# Patient Record
Sex: FEMALE | Race: WHITE | Employment: FULL TIME | URBAN - METROPOLITAN AREA
[De-identification: names, ages, dates, MRNs, and addresses within clinical notes are randomized per-mention and may not be internally consistent; named-entity substitution may affect disease eponyms.]

---

## 2018-07-18 ENCOUNTER — OFFICE VISIT (OUTPATIENT)
Dept: ORTHOPEDIC SURGERY | Age: 58
End: 2018-07-18

## 2018-07-18 VITALS
DIASTOLIC BLOOD PRESSURE: 75 MMHG | HEIGHT: 63 IN | BODY MASS INDEX: 33.66 KG/M2 | WEIGHT: 190 LBS | SYSTOLIC BLOOD PRESSURE: 121 MMHG | HEART RATE: 75 BPM

## 2018-07-18 DIAGNOSIS — S83.241A TEAR OF MEDIAL MENISCUS OF RIGHT KNEE, CURRENT, UNSPECIFIED TEAR TYPE, INITIAL ENCOUNTER: ICD-10-CM

## 2018-07-18 DIAGNOSIS — M25.562 LEFT KNEE PAIN, UNSPECIFIED CHRONICITY: ICD-10-CM

## 2018-07-18 DIAGNOSIS — M25.561 RIGHT KNEE PAIN, UNSPECIFIED CHRONICITY: Primary | ICD-10-CM

## 2018-07-18 DIAGNOSIS — M71.21 BAKER'S CYST OF KNEE, RIGHT: ICD-10-CM

## 2018-07-18 PROCEDURE — 99213 OFFICE O/P EST LOW 20 MIN: CPT | Performed by: PHYSICIAN ASSISTANT

## 2018-07-18 RX ORDER — METHYLPREDNISOLONE 4 MG/1
TABLET ORAL
Qty: 1 KIT | Refills: 0 | Status: SHIPPED | OUTPATIENT
Start: 2018-07-18 | End: 2018-07-24

## 2018-07-18 NOTE — PROGRESS NOTES
arthritic changes are noted. Direction views of the left knee were taken today and reveal normal anatomy with no acute bony abnormalities. Mild patellofemoral arthritic changes are noted    Orders     Orders Placed This Encounter   Procedures    XR KNEE RIGHT (MIN 4 VIEWS)    XR KNEE LEFT (MIN 4 VIEWS)         Assessment / Treatment Plan:     1. Right knee medial meniscus tear/Baker cyst    I discussed with her that her symptoms are most consistent with a Baker cyst as a result of her medial meniscus tear. She is still interested in conservative management of this and was prescribed a Medrol Dosepak today. She has tolerated this medication of pain in the past.  Her left knee pain seems to be compensatory. She does have a brace at home she can use as needed. Brennen Sanchez [facility]    This dictation was performed with a verbal recognition program [facility]) and it was checked for errors. It is possible that there are still dictated errors within this office note. If so, please bring any errors to my attention for an addendum. All efforts were made to ensure that this office note is accurate.

## 2018-08-01 ENCOUNTER — OFFICE VISIT (OUTPATIENT)
Dept: ORTHOPEDIC SURGERY | Age: 58
End: 2018-08-01

## 2018-08-01 VITALS — HEIGHT: 63 IN | WEIGHT: 190 LBS | BODY MASS INDEX: 33.66 KG/M2

## 2018-08-01 DIAGNOSIS — S83.241A TEAR OF MEDIAL MENISCUS OF RIGHT KNEE, CURRENT, UNSPECIFIED TEAR TYPE, INITIAL ENCOUNTER: Primary | ICD-10-CM

## 2018-08-01 PROCEDURE — 99213 OFFICE O/P EST LOW 20 MIN: CPT | Performed by: ORTHOPAEDIC SURGERY

## 2018-08-01 RX ORDER — DICLOFENAC SODIUM 75 MG/1
75 TABLET, DELAYED RELEASE ORAL 2 TIMES DAILY
Qty: 60 TABLET | Refills: 1 | Status: SHIPPED | OUTPATIENT
Start: 2018-08-01 | End: 2018-10-18 | Stop reason: SDUPTHER

## 2018-08-03 ENCOUNTER — TELEPHONE (OUTPATIENT)
Dept: ORTHOPEDIC SURGERY | Age: 58
End: 2018-08-03

## 2018-08-08 ENCOUNTER — OFFICE VISIT (OUTPATIENT)
Dept: ORTHOPEDIC SURGERY | Age: 58
End: 2018-08-08

## 2018-08-08 VITALS — HEIGHT: 63 IN | WEIGHT: 190 LBS | BODY MASS INDEX: 33.66 KG/M2

## 2018-08-08 DIAGNOSIS — S83.281A ACUTE LATERAL MENISCUS TEAR OF RIGHT KNEE, INITIAL ENCOUNTER: ICD-10-CM

## 2018-08-08 DIAGNOSIS — S83.241A ACUTE MEDIAL MENISCUS TEAR OF RIGHT KNEE, INITIAL ENCOUNTER: Primary | ICD-10-CM

## 2018-08-08 DIAGNOSIS — S83.241A TEAR OF MEDIAL MENISCUS OF RIGHT KNEE, CURRENT, UNSPECIFIED TEAR TYPE, INITIAL ENCOUNTER: Primary | ICD-10-CM

## 2018-08-08 DIAGNOSIS — M17.11 PRIMARY OSTEOARTHRITIS OF RIGHT KNEE: ICD-10-CM

## 2018-08-08 DIAGNOSIS — S83.281A OTHER TEAR OF LATERAL MENISCUS OF RIGHT KNEE AS CURRENT INJURY, INITIAL ENCOUNTER: ICD-10-CM

## 2018-08-08 DIAGNOSIS — M25.561 RIGHT KNEE PAIN, UNSPECIFIED CHRONICITY: ICD-10-CM

## 2018-08-08 PROCEDURE — 99213 OFFICE O/P EST LOW 20 MIN: CPT | Performed by: PHYSICIAN ASSISTANT

## 2018-08-08 NOTE — PROGRESS NOTES
Take 10 mg by mouth as needed. , Disp: , Rfl:   Allergies:  Codeine  Social History:    reports that she has never smoked. She has never used smokeless tobacco. She reports that she drinks alcohol. She reports that she does not use drugs. Family History:   Family History   Problem Relation Age of Onset    Heart Disease Mother        REVIEW OF SYSTEMS:   For new problems, a full review of systems will be found scanned in the patient's chart. CONSTITUTIONAL: Denies unexplained weight loss, fevers, chills   NEUROLOGICAL: Denies unsteady gait or progressive weakness  SKIN: Denies skin changes, delayed healing, rash, itching       PHYSICAL EXAM:    Vitals: Height 5' 3\" (1.6 m), weight 190 lb (86.2 kg). GENERAL EXAM:  · General Apparence: Patient is adequately groomed with no evidence of malnutrition. · Orientation: The patient is oriented to time, place and person. · Mood & Affect:The patient's mood and affect are appropriate       RIGHT knee PHYSICAL EXAMINATION:  · Inspection:  Trace swelling but no significant effusion. No ecchymosis or erythema    · Palpation:  The patient has tenderness through the medial and lateral aspects of the knee today. There is mild fullness palpated posteriorly but no significant tenderness of the lower leg      · Range of Motion: Range of motion today is 0-110°    · Strength: Extensor mechanism is intact    · Special Tests:  ACL PCL MCL and LCL feels stable, painful Gabriel's testing            · Skin:  There are no rashes, ulcerations or lesions. · Gait & station: The patient ambulates today with an antalgic gait favoring the RIGHT knee      · Additional Examinations:        Left Lower Extremity: Examination of the left lower extremity does not show any tenderness, deformity or injury. Range of motion is unremarkable. There is no gross instability. There are no rashes, ulcerations or lesions.   Strength and tone are normal.      Diagnostic Testing:      I reviewed the MRI scan.  She has a large trizonal tear of the lateral meniscus measuring 3-4 cm. There is also evidence of degenerative tearing of the medial meniscus as well as chondromalacia primarily of the patellofemoral compartment she also looks to have some chronic inflammation of the MCL    Orders   No orders of the defined types were placed in this encounter. Assessment / Treatment Plan:     1. RIGHT knee medial and lateral meniscus tears with osteoarthritis    I explained to the patient her MRI findings. After discussing treatment options, she would like to move forward with surgery. She understands the risks and benefits of surgery as well as the osteoarthritis in her knee which will likely need treatment in the future. She will sign consent today for RIGHT knee arthroscopy partial medial meniscectomy chondroplasty and synovectomy    We discussed the risks, benefits, and complications of arthroscopic knee surgery. The patient realizes that there are concerns with this surgery with respect to infection, deep vein thrombosis, neurological injury, delayed  rehabilitation, the possibility of arthrofibrosis of the knee, and specifically  Hoffa's fat pad fibrosis that can potentially cause difficulties. The patient realizes that there are also anesthetic concerns including cardiopulmonary issues, pulmonary issues, and even possibility of death or dystrophy. The patient voiced understanding to this as well as the normal  rehabilitation  that   is involved with weeks of physical therapy, exercise, and strengthening. The patient also realizes that even though the surgery, from a functional perspective, typically allows the patient to return to good function at about 6 weeks, that it often takes 6 months to completely rehabilitate from this operation. The patient also realizes that if there is an arthritic component to the symptoms, then they may still have some degree of arthritis pain.

## 2018-08-10 ENCOUNTER — TELEPHONE (OUTPATIENT)
Dept: ORTHOPEDIC SURGERY | Age: 58
End: 2018-08-10

## 2018-08-14 ENCOUNTER — TELEPHONE (OUTPATIENT)
Dept: ORTHOPEDIC SURGERY | Age: 58
End: 2018-08-14

## 2018-08-21 RX ORDER — ACETAMINOPHEN 500 MG
1000 TABLET ORAL PRN
Status: ON HOLD | COMMUNITY
End: 2018-08-28 | Stop reason: HOSPADM

## 2018-08-21 RX ORDER — DIPHENHYDRAMINE HCL 25 MG
25 TABLET ORAL PRN
COMMUNITY

## 2018-08-21 NOTE — PROGRESS NOTES
Obstructive Sleep Apnea (SONIA) Screening     Patient:  Lamberto Chowdhury    YOB: 1960      Medical Record #:  [id number]                     Date:  8/21/2018     1. Are you a loud and/or regular snorer? []  Yes       [x] No    2. Have you been observed to gasp or stop breathing during sleep? []  Yes       [x] No    3. Do you feel tired or groggy upon awakening or do you awaken with a headache?           []  Yes       [] No    4. Are you often tired or fatigued during the wake time hours? []  Yes       [] No    5. Do you fall asleep sitting, reading, watching TV or driving? []  Yes       [] No    6. Do you often have problems with memory or concentration? []  Yes       [] No    **If patient's score is ? 3 they are considered high risk for SONIA. Notify the anesthesiologist of the high risk and document in focus note. Note:  If the patient's BMI is more than 35 kg m¯² , has neck circumference > 40 cm, and/or high blood pressure the risk is greater (© American Sleep Apnea Association, 2006).

## 2018-08-27 ENCOUNTER — ANESTHESIA EVENT (OUTPATIENT)
Dept: OPERATING ROOM | Age: 58
End: 2018-08-27
Payer: COMMERCIAL

## 2018-08-28 ENCOUNTER — ANESTHESIA (OUTPATIENT)
Dept: OPERATING ROOM | Age: 58
End: 2018-08-28
Payer: COMMERCIAL

## 2018-08-28 ENCOUNTER — HOSPITAL ENCOUNTER (OUTPATIENT)
Age: 58
Setting detail: OUTPATIENT SURGERY
Discharge: HOME | End: 2018-08-28
Attending: ORTHOPAEDIC SURGERY | Admitting: ORTHOPAEDIC SURGERY
Payer: COMMERCIAL

## 2018-08-28 VITALS
TEMPERATURE: 97 F | DIASTOLIC BLOOD PRESSURE: 85 MMHG | HEART RATE: 87 BPM | WEIGHT: 179 LBS | HEIGHT: 63 IN | BODY MASS INDEX: 31.71 KG/M2 | RESPIRATION RATE: 16 BRPM | SYSTOLIC BLOOD PRESSURE: 165 MMHG | OXYGEN SATURATION: 96 %

## 2018-08-28 VITALS
RESPIRATION RATE: 6 BRPM | DIASTOLIC BLOOD PRESSURE: 82 MMHG | SYSTOLIC BLOOD PRESSURE: 124 MMHG | OXYGEN SATURATION: 100 %

## 2018-08-28 DIAGNOSIS — Z98.890 S/P ARTHROSCOPY OF KNEE: Primary | ICD-10-CM

## 2018-08-28 DIAGNOSIS — M25.561 ACUTE PAIN OF RIGHT KNEE: ICD-10-CM

## 2018-08-28 PROCEDURE — 3700000001 HC ADD 15 MINUTES (ANESTHESIA): Performed by: ORTHOPAEDIC SURGERY

## 2018-08-28 PROCEDURE — 3600000014 HC SURGERY LEVEL 4 ADDTL 15MIN: Performed by: ORTHOPAEDIC SURGERY

## 2018-08-28 PROCEDURE — 6370000000 HC RX 637 (ALT 250 FOR IP): Performed by: ANESTHESIOLOGY

## 2018-08-28 PROCEDURE — 3700000000 HC ANESTHESIA ATTENDED CARE: Performed by: ORTHOPAEDIC SURGERY

## 2018-08-28 PROCEDURE — 2580000003 HC RX 258: Performed by: ANESTHESIOLOGY

## 2018-08-28 PROCEDURE — 7100000000 HC PACU RECOVERY - FIRST 15 MIN: Performed by: ORTHOPAEDIC SURGERY

## 2018-08-28 PROCEDURE — 6360000002 HC RX W HCPCS: Performed by: ORTHOPAEDIC SURGERY

## 2018-08-28 PROCEDURE — 3600000004 HC SURGERY LEVEL 4 BASE: Performed by: ORTHOPAEDIC SURGERY

## 2018-08-28 PROCEDURE — 7100000001 HC PACU RECOVERY - ADDTL 15 MIN: Performed by: ORTHOPAEDIC SURGERY

## 2018-08-28 PROCEDURE — 7100000011 HC PHASE II RECOVERY - ADDTL 15 MIN: Performed by: ORTHOPAEDIC SURGERY

## 2018-08-28 PROCEDURE — 2500000003 HC RX 250 WO HCPCS: Performed by: NURSE ANESTHETIST, CERTIFIED REGISTERED

## 2018-08-28 PROCEDURE — 6360000002 HC RX W HCPCS: Performed by: NURSE ANESTHETIST, CERTIFIED REGISTERED

## 2018-08-28 PROCEDURE — 2709999900 HC NON-CHARGEABLE SUPPLY: Performed by: ORTHOPAEDIC SURGERY

## 2018-08-28 PROCEDURE — 6360000002 HC RX W HCPCS: Performed by: ANESTHESIOLOGY

## 2018-08-28 PROCEDURE — 7100000010 HC PHASE II RECOVERY - FIRST 15 MIN: Performed by: ORTHOPAEDIC SURGERY

## 2018-08-28 PROCEDURE — 2580000003 HC RX 258: Performed by: ORTHOPAEDIC SURGERY

## 2018-08-28 RX ORDER — LIDOCAINE HYDROCHLORIDE 20 MG/ML
INJECTION, SOLUTION INFILTRATION; PERINEURAL PRN
Status: DISCONTINUED | OUTPATIENT
Start: 2018-08-28 | End: 2018-08-28 | Stop reason: SDUPTHER

## 2018-08-28 RX ORDER — SODIUM CHLORIDE, SODIUM LACTATE, POTASSIUM CHLORIDE, CALCIUM CHLORIDE 600; 310; 30; 20 MG/100ML; MG/100ML; MG/100ML; MG/100ML
INJECTION, SOLUTION INTRAVENOUS CONTINUOUS
Status: DISCONTINUED | OUTPATIENT
Start: 2018-08-28 | End: 2018-08-28 | Stop reason: HOSPADM

## 2018-08-28 RX ORDER — SODIUM CHLORIDE, SODIUM LACTATE, POTASSIUM CHLORIDE, AND CALCIUM CHLORIDE .6; .31; .03; .02 G/100ML; G/100ML; G/100ML; G/100ML
IRRIGANT IRRIGATION PRN
Status: DISCONTINUED | OUTPATIENT
Start: 2018-08-28 | End: 2018-08-28 | Stop reason: HOSPADM

## 2018-08-28 RX ORDER — FENTANYL CITRATE 50 UG/ML
INJECTION, SOLUTION INTRAMUSCULAR; INTRAVENOUS PRN
Status: DISCONTINUED | OUTPATIENT
Start: 2018-08-28 | End: 2018-08-28 | Stop reason: SDUPTHER

## 2018-08-28 RX ORDER — OXYCODONE HYDROCHLORIDE AND ACETAMINOPHEN 5; 325 MG/1; MG/1
2 TABLET ORAL PRN
Status: COMPLETED | OUTPATIENT
Start: 2018-08-28 | End: 2018-08-28

## 2018-08-28 RX ORDER — HYDROCODONE BITARTRATE AND ACETAMINOPHEN 5; 325 MG/1; MG/1
1 TABLET ORAL EVERY 6 HOURS PRN
Qty: 20 TABLET | Refills: 0 | Status: SHIPPED | OUTPATIENT
Start: 2018-08-28 | End: 2018-09-02

## 2018-08-28 RX ORDER — ASPIRIN 325 MG
325 TABLET ORAL DAILY
Qty: 14 TABLET | Refills: 0 | Status: SHIPPED | OUTPATIENT
Start: 2018-08-28 | End: 2018-09-11

## 2018-08-28 RX ORDER — MORPHINE SULFATE 2 MG/ML
2 INJECTION, SOLUTION INTRAMUSCULAR; INTRAVENOUS EVERY 5 MIN PRN
Status: DISCONTINUED | OUTPATIENT
Start: 2018-08-28 | End: 2018-08-28 | Stop reason: HOSPADM

## 2018-08-28 RX ORDER — MIDAZOLAM HYDROCHLORIDE 1 MG/ML
INJECTION INTRAMUSCULAR; INTRAVENOUS
Status: DISCONTINUED
Start: 2018-08-28 | End: 2018-08-28 | Stop reason: HOSPADM

## 2018-08-28 RX ORDER — MORPHINE SULFATE 2 MG/ML
1 INJECTION, SOLUTION INTRAMUSCULAR; INTRAVENOUS EVERY 5 MIN PRN
Status: DISCONTINUED | OUTPATIENT
Start: 2018-08-28 | End: 2018-08-28 | Stop reason: HOSPADM

## 2018-08-28 RX ORDER — ONDANSETRON 2 MG/ML
INJECTION INTRAMUSCULAR; INTRAVENOUS PRN
Status: DISCONTINUED | OUTPATIENT
Start: 2018-08-28 | End: 2018-08-28 | Stop reason: SDUPTHER

## 2018-08-28 RX ORDER — KETOROLAC TROMETHAMINE 30 MG/ML
INJECTION, SOLUTION INTRAMUSCULAR; INTRAVENOUS PRN
Status: DISCONTINUED | OUTPATIENT
Start: 2018-08-28 | End: 2018-08-28 | Stop reason: SDUPTHER

## 2018-08-28 RX ORDER — MIDAZOLAM HYDROCHLORIDE 1 MG/ML
1 INJECTION INTRAMUSCULAR; INTRAVENOUS ONCE
Status: COMPLETED | OUTPATIENT
Start: 2018-08-28 | End: 2018-08-28

## 2018-08-28 RX ORDER — DEXAMETHASONE SODIUM PHOSPHATE 10 MG/ML
INJECTION INTRAMUSCULAR; INTRAVENOUS PRN
Status: DISCONTINUED | OUTPATIENT
Start: 2018-08-28 | End: 2018-08-28 | Stop reason: SDUPTHER

## 2018-08-28 RX ORDER — MEPERIDINE HYDROCHLORIDE 50 MG/ML
12.5 INJECTION INTRAMUSCULAR; INTRAVENOUS; SUBCUTANEOUS EVERY 5 MIN PRN
Status: DISCONTINUED | OUTPATIENT
Start: 2018-08-28 | End: 2018-08-28 | Stop reason: HOSPADM

## 2018-08-28 RX ORDER — MIDAZOLAM HYDROCHLORIDE 1 MG/ML
INJECTION INTRAMUSCULAR; INTRAVENOUS PRN
Status: DISCONTINUED | OUTPATIENT
Start: 2018-08-28 | End: 2018-08-28 | Stop reason: SDUPTHER

## 2018-08-28 RX ORDER — DIPHENHYDRAMINE HYDROCHLORIDE 50 MG/ML
12.5 INJECTION INTRAMUSCULAR; INTRAVENOUS
Status: DISCONTINUED | OUTPATIENT
Start: 2018-08-28 | End: 2018-08-28 | Stop reason: HOSPADM

## 2018-08-28 RX ORDER — OXYCODONE HYDROCHLORIDE AND ACETAMINOPHEN 5; 325 MG/1; MG/1
1 TABLET ORAL PRN
Status: COMPLETED | OUTPATIENT
Start: 2018-08-28 | End: 2018-08-28

## 2018-08-28 RX ORDER — ONDANSETRON 2 MG/ML
4 INJECTION INTRAMUSCULAR; INTRAVENOUS
Status: DISCONTINUED | OUTPATIENT
Start: 2018-08-28 | End: 2018-08-28 | Stop reason: HOSPADM

## 2018-08-28 RX ORDER — LIDOCAINE HYDROCHLORIDE 10 MG/ML
1 INJECTION, SOLUTION EPIDURAL; INFILTRATION; INTRACAUDAL; PERINEURAL
Status: DISCONTINUED | OUTPATIENT
Start: 2018-08-28 | End: 2018-08-28 | Stop reason: HOSPADM

## 2018-08-28 RX ORDER — SODIUM CHLORIDE 0.9 % (FLUSH) 0.9 %
10 SYRINGE (ML) INJECTION EVERY 12 HOURS SCHEDULED
Status: DISCONTINUED | OUTPATIENT
Start: 2018-08-28 | End: 2018-08-28 | Stop reason: HOSPADM

## 2018-08-28 RX ORDER — HYDRALAZINE HYDROCHLORIDE 20 MG/ML
5 INJECTION INTRAMUSCULAR; INTRAVENOUS
Status: DISCONTINUED | OUTPATIENT
Start: 2018-08-28 | End: 2018-08-28 | Stop reason: HOSPADM

## 2018-08-28 RX ORDER — SODIUM CHLORIDE 0.9 % (FLUSH) 0.9 %
10 SYRINGE (ML) INJECTION PRN
Status: DISCONTINUED | OUTPATIENT
Start: 2018-08-28 | End: 2018-08-28 | Stop reason: HOSPADM

## 2018-08-28 RX ORDER — PROPOFOL 10 MG/ML
INJECTION, EMULSION INTRAVENOUS PRN
Status: DISCONTINUED | OUTPATIENT
Start: 2018-08-28 | End: 2018-08-28 | Stop reason: SDUPTHER

## 2018-08-28 RX ORDER — LABETALOL HYDROCHLORIDE 5 MG/ML
5 INJECTION, SOLUTION INTRAVENOUS EVERY 10 MIN PRN
Status: DISCONTINUED | OUTPATIENT
Start: 2018-08-28 | End: 2018-08-28 | Stop reason: HOSPADM

## 2018-08-28 RX ORDER — BUPIVACAINE HYDROCHLORIDE 2.5 MG/ML
INJECTION, SOLUTION INFILTRATION; PERINEURAL PRN
Status: DISCONTINUED | OUTPATIENT
Start: 2018-08-28 | End: 2018-08-28 | Stop reason: HOSPADM

## 2018-08-28 RX ADMIN — KETOROLAC TROMETHAMINE 30 MG: 30 INJECTION, SOLUTION INTRAMUSCULAR; INTRAVENOUS at 09:18

## 2018-08-28 RX ADMIN — SODIUM CHLORIDE, POTASSIUM CHLORIDE, SODIUM LACTATE AND CALCIUM CHLORIDE: 600; 310; 30; 20 INJECTION, SOLUTION INTRAVENOUS at 07:19

## 2018-08-28 RX ADMIN — FENTANYL CITRATE 25 MCG: 50 INJECTION INTRAMUSCULAR; INTRAVENOUS at 09:06

## 2018-08-28 RX ADMIN — MIDAZOLAM HYDROCHLORIDE 1 MG: 2 INJECTION, SOLUTION INTRAMUSCULAR; INTRAVENOUS at 08:09

## 2018-08-28 RX ADMIN — OXYCODONE HYDROCHLORIDE AND ACETAMINOPHEN 1 TABLET: 5; 325 TABLET ORAL at 10:06

## 2018-08-28 RX ADMIN — FENTANYL CITRATE 25 MCG: 50 INJECTION INTRAMUSCULAR; INTRAVENOUS at 09:07

## 2018-08-28 RX ADMIN — MIDAZOLAM HYDROCHLORIDE 2 MG: 2 INJECTION, SOLUTION INTRAMUSCULAR; INTRAVENOUS at 08:48

## 2018-08-28 RX ADMIN — MIDAZOLAM HYDROCHLORIDE 1 MG: 2 INJECTION, SOLUTION INTRAMUSCULAR; INTRAVENOUS at 08:16

## 2018-08-28 RX ADMIN — Medication 2 G: at 08:58

## 2018-08-28 RX ADMIN — DEXAMETHASONE SODIUM PHOSPHATE 8 MG: 10 INJECTION INTRAMUSCULAR; INTRAVENOUS at 09:00

## 2018-08-28 RX ADMIN — LIDOCAINE HYDROCHLORIDE 40 MG: 20 INJECTION, SOLUTION INFILTRATION; PERINEURAL at 08:57

## 2018-08-28 RX ADMIN — FENTANYL CITRATE 50 MCG: 50 INJECTION INTRAMUSCULAR; INTRAVENOUS at 08:57

## 2018-08-28 RX ADMIN — HYDROMORPHONE HYDROCHLORIDE 0.5 MG: 1 INJECTION, SOLUTION INTRAMUSCULAR; INTRAVENOUS; SUBCUTANEOUS at 09:41

## 2018-08-28 RX ADMIN — PROPOFOL 200 MG: 10 INJECTION, EMULSION INTRAVENOUS at 08:57

## 2018-08-28 RX ADMIN — FENTANYL CITRATE 25 MCG: 50 INJECTION INTRAMUSCULAR; INTRAVENOUS at 09:01

## 2018-08-28 RX ADMIN — ONDANSETRON 4 MG: 2 INJECTION INTRAMUSCULAR; INTRAVENOUS at 09:18

## 2018-08-28 RX ADMIN — FENTANYL CITRATE 25 MCG: 50 INJECTION INTRAMUSCULAR; INTRAVENOUS at 09:15

## 2018-08-28 ASSESSMENT — PULMONARY FUNCTION TESTS
PIF_VALUE: 3
PIF_VALUE: 1
PIF_VALUE: 1
PIF_VALUE: 2
PIF_VALUE: 1
PIF_VALUE: 1
PIF_VALUE: 2
PIF_VALUE: 1
PIF_VALUE: 3
PIF_VALUE: 2
PIF_VALUE: 2
PIF_VALUE: 17
PIF_VALUE: 4
PIF_VALUE: 2
PIF_VALUE: 3
PIF_VALUE: 2
PIF_VALUE: 0
PIF_VALUE: 2
PIF_VALUE: 3
PIF_VALUE: 2
PIF_VALUE: 2
PIF_VALUE: 1
PIF_VALUE: 3
PIF_VALUE: 3
PIF_VALUE: 2
PIF_VALUE: 1
PIF_VALUE: 2
PIF_VALUE: 3
PIF_VALUE: 2

## 2018-08-28 ASSESSMENT — PAIN SCALES - GENERAL
PAINLEVEL_OUTOF10: 6
PAINLEVEL_OUTOF10: 7
PAINLEVEL_OUTOF10: 6
PAINLEVEL_OUTOF10: 4
PAINLEVEL_OUTOF10: 5
PAINLEVEL_OUTOF10: 3

## 2018-08-28 ASSESSMENT — PAIN DESCRIPTION - DESCRIPTORS: DESCRIPTORS: SHARP

## 2018-08-28 ASSESSMENT — PAIN - FUNCTIONAL ASSESSMENT: PAIN_FUNCTIONAL_ASSESSMENT: 0-10

## 2018-08-28 NOTE — H&P
I have reviewed the history and physical and examined the patient and find no relevant changes. I have reviewed with the patient and/or family the risks, benefits, and alternatives to the procedure. Patient being given increased dosage/quantity of opoid pain medication in excess of OSMB guidelines which noted a 30 MED daily of opioids due to the fact that he/she has undergone major orthopaedic surgery as outlined in rule 4731-11-13. Dosages and further duration of the pain medication will be re-evaluated at her post op visit in 2 weeks. Patient was educated on dosing expectations and limits of prescribing as a result of the new [facility] rules enacted August 31, 2017. Please also note that this is not the initial opoid prescription issued to this patient but a continuation of medication utilized during the hospital admission as noted in the medical record. OARRS report has also been utilized to screen for any abuse history or suspicious activity as outlined in [place]. All efforts have been taken to prevent abuse potential and misuse of opioid medications including education, screening, and close clinical follow up.

## 2018-08-28 NOTE — PROGRESS NOTES
Advancing hob without compaints  Tolerating Drinking and eating   Medicated for pain with improvement   No ponv  Pt alert and appropriate  Respirations  Easy/ unlabored/ no Chest pain or SOB   Surgical drsg unchanged from initial assessment  Circulation checks on operative limb unchanged from last entry in pacu

## 2018-08-28 NOTE — OP NOTE
[facility] [place] [address]                                 OPERATIVE REPORT    PATIENT NAME: João Schultz                    :        1960  MED REC NO:   [id number]                          ROOM:  ACCOUNT NO:   [de-identified]                           ADMIT DATE: 2018  PROVIDER:     Shaneka Atkins MD    DATE OF PROCEDURE:  2018    SERVICE:  Orthopedic Surgery. SURGEON:  MD AILYN Manriquez ASSISTANTJocelyn Arreola SA    PREOPERATIVE DIAGNOSES:  Right knee lateral meniscus tear and early  osteoarthritis of the right knee. POSTOPERATIVE DIAGNOSES:  1. Complex degenerative tear of mid body and posterior horn of the right  lateral meniscus involving 40% of the meniscus. 2.  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 3.  Synovitis and adhesions. OPERATIVE PROCEDURE:  1. Examination under anesthesia and video arthroscopy of the right knee. 2.  Partial lateral meniscectomy. 3.  Chondroplasty of the patellofemoral compartment. 4.  Synovectomy and resection of adhesions. ANESTHESIA:  General.    SPONGE COUNTS:  Correct. DISPOSITION:  To recovery room. ANTIBIOTICS:  Per SCIP protocol. ESTIMATED BLOOD LOSS:  Negligible. COMPLICATIONS:  None. INDICATION FOR SURGERY:  The patient is a 50-year-old woman, who presented  to the orthopedic office with an ongoing lateral side knee pain. The  clinical examination and MRI scan demonstrated the aforementioned  preoperative findings. We talked about the risks, benefits, and potential  complications of the operation as well as a normal rehabilitative protocol. We did note some signal findings in the medial meniscus, so she was  scheduled for a possible medial meniscectomy as well. This will be  assessed at the time of the surgery.   This patient realized concerns  regarding infection, deep vein thrombosis, pulmonary without  difficulty. No other pathology was demonstrated. A 30 mL of Marcaine was  instilled into the portals upon completion of the case. The patient went  to the recovery room and dry sterile dressings were placed over the  Steri-Strips.         Russ Landau, MD    D: 08/28/2018 9:34:39       T: 08/28/2018 10:52:39     [email or web address]  Job#: [id number]     Doc#: [id number]    CC:

## 2018-08-28 NOTE — ANESTHESIA POSTPROCEDURE EVALUATION
Department of Anesthesiology  Postprocedure Note    Patient: Ann Abreu  MRN: [id number]  YOB: 1960  Date of evaluation: 8/28/2018  Time:  11:38 AM     Procedure Summary     Date:  08/28/18 Room / Location:  [facility] OR [address] [place] [address] OR    Anesthesia Start:  0848 Anesthesia Stop:  1008    Procedure:  EXAM UNDER ANESTHESIA VIDEO ARTHROSCOPY RIGHT KNEE, PARTIAL LATERAL MENISCECTOMY,  CHONDROPLASTY PATELLA , SYNOVECTOMY (Right Knee) Diagnosis:       Acute medial meniscus tear of right knee, subsequent encounter      Acute lateral meniscus tear of right knee, subsequent encounter      Primary osteoarthritis of right knee      (MEDIAL MENISCUS TEAR,LATERAL MENISCUS TEAR,AND OSTEOARTHRITIS RIGHT KNEE)    Surgeon:  Allison Pena MD Responsible Provider:  Terrance Ruiz MD    Anesthesia Type:  general ASA Status:  2          Anesthesia Type: general    Malcolm Phase I: Malcolm Score: 10    Malcolm Phase II: Malcolm Score: 10    Last vitals: Reviewed and per EMR flowsheets.        Anesthesia Post Evaluation    Patient location during evaluation: bedside  Patient participation: complete - patient participated  Level of consciousness: awake  Airway patency: patent  Complications: no  Cardiovascular status: blood pressure returned to baseline  Comments: Postoperative Anesthesia Note    Name:    Ann Abreu  MRN:      [id number]    Patient Vitals in the past 12 hrs:  08/28/18 1030, BP:(!) 165/85, Pulse:87, Resp:16, SpO2:96 %  08/28/18 1015, BP:(!) 153/85, Pulse:88, Resp:17, SpO2:95 %  08/28/18 1000, BP:139/69, Pulse:85, Resp:14, SpO2:96 %  08/28/18 0945, BP:(!) 148/81, Pulse:83, Resp:14, SpO2:96 %  08/28/18 0940, BP:139/83, Temp:97 °F (36.1 °C), Temp src:Temporal, Pulse:86, Resp:19, SpO2:96 %  08/28/18 0935, BP:(!) 155/77, Pulse:88, Resp:17, SpO2:93 %  08/28/18 0930, BP:(!) 156/86, Temp:97.4 °F (36.3 °C), Temp src:Temporal, Pulse:92, Resp:15, SpO2:94 %  08/28/18 0655, BP:(!) 151/87, Temp:97.7 °F (36.5 °C), Temp src:Temporal, Pulse:100, Resp:16, SpO2:97 %, Height:5' 3\" (1.6 m), Weight:179 lb (81.2 kg)     LABS:    CBC  No results found for: WBC, HGB, HCT, PLT  RENAL  No results found for: NA, K, CL, CO2, BUN, CREATININE, GLUCOSE  COAGS  No results found for: PROTIME, INR, APTT    Intake & Output: In: 800 (I.V.:800)  Out: -     Nausea & Vomiting:  No    Level of Consciousness:  Awake    Pain Assessment:  Adequate analgesia    Anesthesia Complications:  No apparent anesthetic complications    SUMMARY      Vital signs stable  OK to discharge from Stage I post anesthesia care.   Care transferred from Anesthesiology department on discharge from perioperative area

## 2018-08-28 NOTE — PROGRESS NOTES
Advanced pt from lying to sitting without adverse event/pt denies complaints of dizziness/ponv/ RESP  WNL/ surgical drsg/circulation checks on operative limb unchanged from my  last pacu assessment entry     Pt states pain is at a tolerable level-4     instructed pt if no void in 8 hours contact physician or go to ER    Discharge instructions reviewed with patient/responsible adult and understanding verbalized. Discharge instructions signed and copies given. Rx         norco/asa           Given to pts responsible adult/instructed not to drive/ingest alcohol while taking narcotic medications. Instructed pt/family member Last dose of narcotics given in recovery room was       percocet       @             Am/pm  . Medication information  sheet given to pt/family-all questions answered     Please follow narcotic administration as prescribed by your surgeon- any questions call your surgeon. If you have any problems contact your surgeon and  Go to the emergency room.     Operative drsg unchanged from my initial pacu assessment    Pt waiting for Dr Araujo  to talk with her about surgery- /pt dressed  Waiting in recliner chair with  at side- eating 2nd cup crackers and 2 nd glass water

## 2018-08-30 ENCOUNTER — HOSPITAL ENCOUNTER (OUTPATIENT)
Dept: PHYSICAL THERAPY | Age: 58
Setting detail: THERAPIES SERIES
Discharge: HOME | End: 2018-08-30
Payer: COMMERCIAL

## 2018-08-30 PROCEDURE — G8978 MOBILITY CURRENT STATUS: HCPCS

## 2018-08-30 PROCEDURE — G8979 MOBILITY GOAL STATUS: HCPCS

## 2018-08-30 PROCEDURE — 97161 PT EVAL LOW COMPLEX 20 MIN: CPT

## 2018-08-30 PROCEDURE — 97110 THERAPEUTIC EXERCISES: CPT

## 2018-08-30 NOTE — PLAN OF CARE
body mechanics with sitting, bending, and lifting   []Reduced ability to sleep   [x] Reduced ability or tolerance with driving and/or computer work   [x]Reduced ability to perform lifting, carrying tasks   [x]Reduced ability to squat   []Reduced ability to forward bend   [x]Reduced ability to ambulate prolonged functional periods/distances/surfaces   [x]Reduced ability to ascend/descend stairs   [x]Reduced ability to run, hop, cut or jump   []other:    Participation Restrictions   []Reduced participation in self care activities   [x]Reduced participation in home management activities   [x]Reduced participation in work activities   []Reduced participation in social activities. [x]Reduced participation in sport/recreation activities. Classification :    [x]Signs/symptoms consistent with post-surgical status including decreased ROM, strength and function.    []Signs/symptoms consistent with joint sprain/strain   []Signs/symptoms consistent with patella-femoral syndrome   []Signs/symptoms consistent with knee OA/hip OA   []Signs/symptoms consistent with internal derangement of knee/Hip   []Signs/symptoms consistent with functional hip weakness/NMR control      []Signs/symptoms consistent with tendinitis/tendinosis    []signs/symptoms consistent with pathology which may benefit from Dry needling      []other:      Prognosis/Rehab Potential:      []Excellent   [x]Good    []Fair   []Poor    Tolerance of evaluation/treatment:    []Excellent   [x]Good    []Fair   []Poor  Physical Therapy Evaluation Complexity Justification  [x] A history of present problem with:  [] no personal factors and/or comorbidities that impact the plan of care;  [x]1-2 personal factors and/or comorbidities that impact the plan of care  []3 personal factors and/or comorbidities that impact the plan of care  [x] An examination of body systems using standardized tests and measures addressing any of the following: body structures and functions (impairments), activity limitations, and/or participation restrictions;:  [x] a total of 1-2 or more elements   [] a total of 3 or more elements   [] a total of 4 or more elements   [x] A clinical presentation with:  [x] stable and/or uncomplicated characteristics   [] evolving clinical presentation with changing characteristics  [] unstable and unpredictable characteristics;   [x] Clinical decision making of [x] low, [] moderate, [] high complexity using standardized patient assessment instrument and/or measurable assessment of functional outcome. [x] EVAL (LOW) 87013 (typically 20 minutes face-to-face)  [] EVAL (MOD) 72871 (typically 30 minutes face-to-face)  [] EVAL (HIGH) 37626 (typically 45 minutes face-to-face)  [] RE-EVAL       PLAN:   Frequency/Duration:  1-2 days per week for 4 Weeks:  Interventions:  [x]  Therapeutic exercise including: strength training, ROM, for Lower extremity and core   [x]  NMR activation and proprioception for LE, Glutes and Core   [x]  Manual therapy as indicated for LE, Hip and spine to include: Dry Needling/IASTM, STM, PROM, Gr I-IV mobilizations, manipulation. [x] Modalities as needed that may include: thermal agents, E-stim, Biofeedback, US, iontophoresis as indicated  [x] Patient education on joint protection, postural re-education, activity modification, progression of HEP. HEP instruction: Pt instructed on HEP. (see scanned forms)    GOALS:  Patient stated goal: Working in the garden and return to regular work in gym. Therapist goals for Patient:   Short Term Goals: To be achieved in: 2 weeks  1. Independent in HEP and progression per patient tolerance, in order to prevent re-injury. 2. Patient will have a decrease in pain to facilitate improvement in movement, function, and ADLs as indicated by Functional Deficits. Long Term Goals: To be achieved in: 4 weeks  1.  Disability index score of 20% or less for the LEFS to assist with reaching prior level of

## 2018-08-30 NOTE — FLOWSHEET NOTE
less for the LEFS to assist with reaching prior level of function. 2. Patient will demonstrate increased AROM to 0-130 to allow for proper joint functioning as indicated by patients Functional Deficits. 3. Patient will demonstrate 5/5 MMT for all R hip and knee motions measured for improved strength and control to allow for ability to perform functional tasks. 4. Patient will return to community ambulation without the use of AD and without significant LOB functional activities without increased symptoms or restriction. 5. Patient will return to gardening and other functional activities with reported R knee pain less than 1/10. (patient specific functional goal)                  Progression Towards Functional goals:  [] Patient is progressing as expected towards functional goals listed. [] Progression is slowed due to complexities listed. [] Progression has been slowed due to co-morbidities.   [x] Plan just implemented, too soon to assess goals progression  [] Other:     ASSESSMENT:  See eval    Treatment/Activity Tolerance:  [x] Patient tolerated treatment well [] Patient limited by fatique  [] Patient limited by pain  [] Patient limited by other medical complications  [] Other:     Prognosis: [x] Good [] Fair  [] Poor    Patient Requires Follow-up: [x] Yes  [] No    PLAN: See eval  [] Continue per plan of care [] Alter current plan (see comments)  [x] Plan of care initiated [] Hold pending MD visit [] Discharge    Electronically signed by: Lillian Romero, PT, DPT

## 2018-08-31 ENCOUNTER — OFFICE VISIT (OUTPATIENT)
Dept: ORTHOPEDIC SURGERY | Age: 58
End: 2018-08-31

## 2018-08-31 VITALS — BODY MASS INDEX: 33.66 KG/M2 | WEIGHT: 190 LBS | HEIGHT: 63 IN

## 2018-08-31 DIAGNOSIS — Z98.890 S/P ARTHROSCOPIC PARTIAL LATERAL MENISCECTOMY: Primary | ICD-10-CM

## 2018-08-31 PROCEDURE — 99024 POSTOP FOLLOW-UP VISIT: CPT | Performed by: ORTHOPAEDIC SURGERY

## 2018-08-31 NOTE — LETTER
[employer] [place] [address]  Phone: [phone]  Fax: [phone]    Christine Lopez MD        August 31, 2018     Patient: Sha Rodriguez   YOB: 1960   Date of Visit: 8/31/2018       To Whom It May Concern: It is my medical opinion that Joseph Alejandro may be excused from work 8/22/18-8/24/18 and 8/27/18 due to her RIGHT knee surgery. If you have any questions or concerns, please don't hesitate to call.     Sincerely,        Christine Lopez MD

## 2018-09-05 ENCOUNTER — HOSPITAL ENCOUNTER (OUTPATIENT)
Dept: PHYSICAL THERAPY | Age: 58
Setting detail: THERAPIES SERIES
End: 2018-09-05
Payer: COMMERCIAL

## 2018-09-07 ENCOUNTER — HOSPITAL ENCOUNTER (OUTPATIENT)
Dept: PHYSICAL THERAPY | Age: 58
Setting detail: THERAPIES SERIES
Discharge: HOME | End: 2018-09-07
Payer: COMMERCIAL

## 2018-09-07 PROCEDURE — 97110 THERAPEUTIC EXERCISES: CPT

## 2018-09-07 PROCEDURE — 97140 MANUAL THERAPY 1/> REGIONS: CPT

## 2018-09-07 NOTE — FLOWSHEET NOTE
and has probably done more sitting and walking than she should have. OBJECTIVE:   Observation: superior incision healed. Tegaderm and steri-strips still on remaining incisions. Pt enters PT s crutches  Test measurements:  Knee flexion AAROM 114, knee ext 0    RESTRICTIONS/PRECAUTIONS: R knee arthroscopy DOS 8/28/18   OPERATIVE PROCEDURE:  1.  Examination under anesthesia and video arthroscopy of the right knee. 2.  Partial lateral meniscectomy. 3.  Chondroplasty of the patellofemoral compartment. 4.  Synovectomy and resection of adhesions. Exercises/Interventions:     Therapeutic Ex Sets/sec Reps Notes   Pt ed: ok to take tylenol for pain relief 5'           Quad sets 10\"x1  HEP   SLR   Hold d/t HF soreness HEP   SAQ 3\" 2x10  HEP   LAQ 3\" 2 x10  HEP   Heel slides with SWB  \" on table with strap   x10    HEP   bridge x15  HEP   Standing TKE 10\"x20  Green TB (pt cannot lie prone d/t shoulder pain)   Gastroc stretch standing on wedge 30\"x3  HEP   LS hamstring stretch   HEP   Kalyan stretch 30'x3  HEP               Bike 6'  2' set-up, bwd initially, then fwd after 2'   Manual Intervention      Patellar mobs - all directions 2'  HEP   DTM HF R 3'     FR quads 3'                       NMR re-education      Gait training 2  Crutches, progressing to no AD   NV gait train s AD                                     Therapeutic Exercise and NMR EXR  [x] (31698) Provided verbal/tactile cueing for activities related to strengthening, flexibility, endurance, ROM for improvements in LE, proximal hip, and core control with self care, mobility, lifting, ambulation.  [] (77778) Provided verbal/tactile cueing for activities related to improving balance, coordination, kinesthetic sense, posture, motor skill, proprioception  to assist with LE, proximal hip, and core control in self care, mobility, lifting, ambulation and eccentric single leg control.      NMR and Therapeutic Activities:    [] (72173 or 05374) Provided pain  [] Patient limited by other medical complications  [] Other:     Prognosis: [x] Good [] Fair  [] Poor    Patient Requires Follow-up: [x] Yes  [] No    PLAN: Continue to improve R knee ROM and strength, pt unable to get prone, may try to assess source of hip pain further if time NV.    [x] Continue per plan of care [] Alter current plan (see comments)  [] Plan of care initiated [] Hold pending MD visit [] Discharge    Electronically signed by: Stefani Gonsalez PT, DPT

## 2018-09-10 ENCOUNTER — HOSPITAL ENCOUNTER (OUTPATIENT)
Dept: PHYSICAL THERAPY | Age: 58
Setting detail: THERAPIES SERIES
Discharge: HOME | End: 2018-09-10
Payer: COMMERCIAL

## 2018-09-10 PROCEDURE — 97110 THERAPEUTIC EXERCISES: CPT

## 2018-09-10 NOTE — FLOWSHEET NOTE
[facility], [address]  Phone: [phone]  Fax [phone]    Physical Therapy Daily Treatment Note  Date:  9/10/2018    Patient Name:  Martha Cortez  \"Jessica\"  :  1960  MRN: [id number]  Restrictions/Precautions:    Medical/Treatment Diagnosis Information:  · Diagnosis: S83.241A (ICD-10-CM) - Acute medial meniscus tear of right knee, initial encounter, S83.281A (ICD-10-CM) - Acute lateral meniscus tear of right knee, initial encounter, M17.11 (ICD-10-CM) - Primary osteoarthritis of right knee  · Treatment Diagnosis: M25.561 Pain in Right knee, M25.661 Stiffness of Right knee, R26.2 Difficulty walking  Insurance/Certification information:  PT Insurance Information: 8/10/18 ANTHEM - $0CP - $2500 DED - 80/20% - 20 PT - NEED AUTH HARD  Physician Information:  Referring Practitioner: Dr. Nica Castaneda of care signed (Y/N):     Date of Patient follow up with Physician: 18    G-Code (if applicable):      Date G-Code Applied:  18  PT G-Codes  Functional Assessment Tool Used: LEFS  Score: 62  Functional Limitation: Mobility: Walking and moving around  Mobility: Walking and Moving Around Current Status (): At least 60 percent but less than 80 percent impaired, limited or restricted  Mobility: Walking and Moving Around Goal Status (): At least 20 percent but less than 40 percent impaired, limited or restricted    Progress Note: [x]  Yes  []  No  Next due by: Visit #10       Latex Allergy:  [x]NO      []YES  Preferred Language for Healthcare:   [x]English       []other:    Visit # Insurance Allowable Requires auth   3 20    []no        [x]yes:       Pain level:  0-2, in knee  c/o 10/10 pain in B shoulders in the morning and throughout the whole day and 10/10 pain in hips in the morning that diminishes to \"moderate\" by the end of the day.       SUBJECTIVE:  Pt reports that her knee continues to feel revolutions   Manual Intervention      Patellar mobs - all directions 2'  HEP   DTM HF R     FR quads  Too sore                     NMR re-education      Gait training 2  Crutches, progressing to no AD   NV gait train s AD   Gait training with no crutch: cue for heel toe 20'x2  Hip pain limited inc'd walking distance, no improved gait pattern with/without 1 crutch as knee pain is not limiting factor                               Therapeutic Exercise and NMR EXR  [x] (94152) Provided verbal/tactile cueing for activities related to strengthening, flexibility, endurance, ROM for improvements in LE, proximal hip, and core control with self care, mobility, lifting, ambulation.  [] (04725) Provided verbal/tactile cueing for activities related to improving balance, coordination, kinesthetic sense, posture, motor skill, proprioception  to assist with LE, proximal hip, and core control in self care, mobility, lifting, ambulation and eccentric single leg control.      NMR and Therapeutic Activities:    [] (71615 or 73063) Provided verbal/tactile cueing for activities related to improving balance, coordination, kinesthetic sense, posture, motor skill, proprioception and motor activation to allow for proper function of core, proximal hip and LE with self care and ADLs  [] (90194) Gait Re-education- Provided training and instruction to the patient for proper LE, core and proximal hip recruitment and positioning and eccentric body weight control with ambulation re-education including up and down stairs     Home Exercise Program:    [x] (59786) Reviewed/Progressed HEP activities related to strengthening, flexibility, endurance, ROM of core, proximal hip and LE for functional self-care, mobility, lifting and ambulation/stair navigation   [] (12873)Reviewed/Progressed HEP activities related to improving balance, coordination, kinesthetic sense, posture, motor skill, proprioception of core, proximal hip and LE for self care, mobility,

## 2018-09-13 ENCOUNTER — HOSPITAL ENCOUNTER (OUTPATIENT)
Dept: PHYSICAL THERAPY | Age: 58
Setting detail: THERAPIES SERIES
Discharge: HOME | End: 2018-09-13
Payer: COMMERCIAL

## 2018-09-13 PROCEDURE — 97110 THERAPEUTIC EXERCISES: CPT

## 2018-09-13 PROCEDURE — 97140 MANUAL THERAPY 1/> REGIONS: CPT

## 2018-09-13 NOTE — FLOWSHEET NOTE
[facility], [address]  Phone: [phone]  Fax [phone]    Physical Therapy Daily Treatment Note  Date:  2018    Patient Name:  Debby Alvarado  \"Jessica\"  :  1960  MRN: [id number]  Restrictions/Precautions:    Medical/Treatment Diagnosis Information:  · Diagnosis: S83.241A (ICD-10-CM) - Acute medial meniscus tear of right knee, initial encounter, S83.281A (ICD-10-CM) - Acute lateral meniscus tear of right knee, initial encounter, M17.11 (ICD-10-CM) - Primary osteoarthritis of right knee  · Treatment Diagnosis: M25.561 Pain in Right knee, M25.661 Stiffness of Right knee, R26.2 Difficulty walking  Insurance/Certification information:  PT Insurance Information: 8/10/18 ANTHEM - $0CP - $2500 DED - 80/20% - 20 PT - NEED AUTH HARD  Physician Information:  Referring Practitioner: Dr. Luciano Khan of care signed (Y/N):     Date of Patient follow up with Physician: 18    G-Code (if applicable):      Date G-Code Applied:  18  PT G-Codes  Functional Assessment Tool Used: LEFS  Score: 62  Functional Limitation: Mobility: Walking and moving around  Mobility: Walking and Moving Around Current Status (): At least 60 percent but less than 80 percent impaired, limited or restricted  Mobility: Walking and Moving Around Goal Status (): At least 20 percent but less than 40 percent impaired, limited or restricted    Progress Note: [x]  Yes  []  No  Next due by: Visit #10       Latex Allergy:  [x]NO      []YES  Preferred Language for Healthcare:   [x]English       []other:    Visit # Insurance Allowable Requires auth   4 20    []no        [x]yes:       Pain level:  0-2, in knee       SUBJECTIVE:  Pt had a f/u with her PCP, who ordered blood work, B shoulder MRIs (were denied by her insurance so she got B xrays), and she was placed on a steroid pack and a mm relaxor.  She got a script for PT for her NMR re-education      Gait training 2  Crutches, progressing to no AD   NV gait train s AD   Gait training with no crutch: cue for heel toe   Hip pain limited inc'd walking distance, no improved gait pattern with/without 1 crutch as knee pain is not limiting factor                               Therapeutic Exercise and NMR EXR  [x] (48169) Provided verbal/tactile cueing for activities related to strengthening, flexibility, endurance, ROM for improvements in LE, proximal hip, and core control with self care, mobility, lifting, ambulation.  [] (93305) Provided verbal/tactile cueing for activities related to improving balance, coordination, kinesthetic sense, posture, motor skill, proprioception  to assist with LE, proximal hip, and core control in self care, mobility, lifting, ambulation and eccentric single leg control.      NMR and Therapeutic Activities:    [] (79802 or 26753) Provided verbal/tactile cueing for activities related to improving balance, coordination, kinesthetic sense, posture, motor skill, proprioception and motor activation to allow for proper function of core, proximal hip and LE with self care and ADLs  [] (05841) Gait Re-education- Provided training and instruction to the patient for proper LE, core and proximal hip recruitment and positioning and eccentric body weight control with ambulation re-education including up and down stairs     Home Exercise Program:    [x] (50255) Reviewed/Progressed HEP activities related to strengthening, flexibility, endurance, ROM of core, proximal hip and LE for functional self-care, mobility, lifting and ambulation/stair navigation   [] (72635)Reviewed/Progressed HEP activities related to improving balance, coordination, kinesthetic sense, posture, motor skill, proprioception of core, proximal hip and LE for self care, mobility, lifting, and ambulation/stair navigation      Manual Treatments:  PROM / STM / Oscillations-Mobs:  G-I, II, III, IV (PA's, Inf.,

## 2018-09-17 ENCOUNTER — HOSPITAL ENCOUNTER (OUTPATIENT)
Dept: PHYSICAL THERAPY | Age: 58
Setting detail: THERAPIES SERIES
Discharge: HOME | End: 2018-09-17
Payer: COMMERCIAL

## 2018-09-17 PROCEDURE — 97140 MANUAL THERAPY 1/> REGIONS: CPT

## 2018-09-17 PROCEDURE — 97110 THERAPEUTIC EXERCISES: CPT

## 2018-09-17 NOTE — FLOWSHEET NOTE
measurements:  Knee AROM 0- flexion AAROM 119    RESTRICTIONS/PRECAUTIONS: R knee arthroscopy DOS 8/28/18    OPERATIVE PROCEDURE:  1.  Examination under anesthesia and video arthroscopy of the right knee. 2.  Partial lateral meniscectomy. 3.  Chondroplasty of the patellofemoral compartment. 4.  Synovectomy and resection of adhesions. Exercises/Interventions:     Therapeutic Ex Sets/sec Reps Notes   Pt ed: Can wait for bloodwork results and finishing steroid.  If still having shoulder pain, can schedule separate PT eval                       4'           Quad sets   HEP   SLR   Hold d/t HF soreness HEP   SAQ 3\" 2x10  1# HEP   LAQ 3\" 2x10  1# HEP   Heel slides with SWB  With strap for OP  \" on table AROM x10  10\"x10   x10   HEP     Bridge x20  HEP   HL hip abd  HL hip add with ball x30  x20  OVL HEP   SL hip abd 2x10  Tactile cue to remain in proper position   clamshells 2x10  OVL   Standing TKE   Green TB (pt cannot lie prone d/t shoulder pain)   Gastroc stretch standing on incline board 30\"x3  HEP   LS hamstring stretch   HEP   Kalyan stretch   Hip to sore   HEP   Quad stretch-standing with foot on chair      Step-up  LSU 2x10  2x10  4\"   B PF x30     KRAIS: TKE   Resume NV 45#   Bike 8'  I after set-up  Full revolutions   Manual Intervention      Patellar mobs - all directions 2'  HEP   Manual HS S as pt states too sore in hip with LS/strap HS stretch 30\"x3    DTM HF R     FR quads  Too sore                     NMR re-education      Gait training 2  Crutches, progressing to no AD   NV gait train s AD   Gait training with no crutch: cue for heel toe   Hip pain limited inc'd walking distance, no improved gait pattern with/without 1 crutch as knee pain is not limiting factor                               Therapeutic Exercise and NMR EXR  [x] (51490) Provided verbal/tactile cueing for activities related to strengthening, flexibility, endurance, ROM for improvements in LE, proximal hip, and core control with self

## 2018-09-19 ENCOUNTER — HOSPITAL ENCOUNTER (OUTPATIENT)
Dept: PHYSICAL THERAPY | Age: 58
Setting detail: THERAPIES SERIES
Discharge: HOME | End: 2018-09-19
Payer: COMMERCIAL

## 2018-09-19 ENCOUNTER — APPOINTMENT (OUTPATIENT)
Dept: PHYSICAL THERAPY | Age: 58
End: 2018-09-19
Payer: COMMERCIAL

## 2018-09-19 PROCEDURE — G0283 ELEC STIM OTHER THAN WOUND: HCPCS

## 2018-09-19 PROCEDURE — 97110 THERAPEUTIC EXERCISES: CPT

## 2018-09-19 PROCEDURE — 97140 MANUAL THERAPY 1/> REGIONS: CPT

## 2018-09-19 PROCEDURE — G8984 CARRY CURRENT STATUS: HCPCS

## 2018-09-19 PROCEDURE — G8978 MOBILITY CURRENT STATUS: HCPCS

## 2018-09-19 PROCEDURE — G8985 CARRY GOAL STATUS: HCPCS

## 2018-09-19 PROCEDURE — 97161 PT EVAL LOW COMPLEX 20 MIN: CPT

## 2018-09-19 PROCEDURE — G8979 MOBILITY GOAL STATUS: HCPCS

## 2018-09-19 NOTE — PLAN OF CARE
[facility], [address]  Phone: [phone]  Fax [phone]     Physical Therapy Certification    Dear Referring Practitioner: Mike Montelongo,    We had the pleasure of evaluating the following patient for physical therapy services at [address] [place] [address]. A summary of our findings can be found in the initial assessment below. This includes our plan of care. If you have any questions or concerns regarding these findings, please do not hesitate to contact me at the office phone number checked above. Thank you for the referral.       Physician Signature:_______________________________Date:__________________  By signing above (or electronic signature), therapists plan is approved by physician    Patient: Jef Gallo   : 1960   MRN: [id number]  Referring Physician: Referring Practitioner: Mike Montelongo      Evaluation Date: 2018      Medical Diagnosis Information:  Diagnosis: C93.562, M25.512 - Pain in B shoulder joints; R29.898 - weakness of shoulders   Treatment Diagnosis: M75.41 - Impingement of R shoulder, M75.42 - Impingement of L shoulder, M25.611 - stiffness of R shoulder, M25.612 - stiffness of L shoulder, M25.511 - pain in R shoulder; M25.512 pain in L shoulder                                         Insurance information: PT Insurance Information: ANTHEM    Precautions/ Contra-indications:   Latex Allergy:  [x]NO      []YES  Preferred Language for Healthcare:   [x]English       []other:    SUBJECTIVE: Patient stated complaint: Pt states that she believes her shoulder pain started in late July after she had knee surgery and was required to use her shoulders more to get around for IADLs. Pt states that pain was initially L>R, but that currently she would rate her pain as R>L. Pt states that imaging shows a bone spur in [facility] joint that she believes is in the L shoulder.  Pt reports that she recently finished a steroid pack this past weekend and is currently on a muscle relaxer and anti inflammatory medication. Relevant Medical History: Previous ankle surgery to right lateral ankle in Dec. 2010. Pt states arthritic changes in R ankle; R knee arthroscopy DOS 8/28/2018  Functional Disability Index:PT G-Codes  Functional Assessment Tool Used: QuickDASH  Score: 52%  Functional Limitation: Carrying, moving and handling objects  Carrying, Moving and Handling Objects Current Status (): At least 40 percent but less than 60 percent impaired, limited or restricted  Carrying, Moving and Handling Objects Goal Status (): At least 20 percent but less than 40 percent impaired, limited or restricted    Pain Scale: 8-10/10 L; 8-10/10 R  Easing factors: moist heat, rest  Provocative factors: driving, reaching behind back, reaching overhead, prolonged pressure     Type: []Constant   [x]Intermittent  []Radiating [x]Localized: ant shoulders at acromion []other:     Numbness/Tingling: None    Occupation/School:     Living Status/Prior Level of Function: Independent with ADLs and IADLs, gym, work    OBJECTIVE:     CERV ROM     Cervical Flexion WNL    Cervical Extension     Cervical SB     Cervical rotation          ROM Left Right   Shoulder Flex 86 P! 75 P! Shoulder Abd 74 P! 72 P! Shoulder ER 22 65   Shoulder IR 22 at 45 abd 85 at 60 abd                  Strength  Left Right   Shoulder Flex Did not test d/t pain Did not test d/t pain   Shoulder Scap     Shoulder ER     Shoulder IR                 Reflexes/Sensation:    [x]Dermatomes/Myotomes intact    [x]Reflexes equal and normal bilaterally   []Other:    Joint mobility: Unable to test d/t pain   []Normal    []Hypo   []Hyper    Palpation: No TTP along B anterior acromion and AC joint, tenderness along B biceps tendon     Functional Mobility/Transfers:  WNL    Posture: forward head, rounded posture, B elevated shoulders with movement; high barriers  [x]past PT/medical experience: current PT for knee; hesitant with new treatment plans  []other:  Justification:     Falls Risk Assessment (30 days):   [x] Falls Risk assessed and no intervention required. [] Falls Risk assessed and Patient requires intervention due to being higher risk   TUG score (>12s at risk):     [] Falls education provided, including       G-Codes:  PT G-Codes  Functional Assessment Tool Used: QuickDASH  Score: 52%  Functional Limitation: Carrying, moving and handling objects  Carrying, Moving and Handling Objects Current Status (): At least 40 percent but less than 60 percent impaired, limited or restricted  Carrying, Moving and Handling Objects Goal Status ():  At least 20 percent but less than 40 percent impaired, limited or restricted    ASSESSMENT:   Functional Impairments   []Noted spinal or UE joint hypomobility   []Noted spinal or UE joint hypermobility   [x]Decreased UE functional ROM   [x]Decreased UE functional strength   []Abnormal reflexes/sensation/myotomal/dermatomal deficits   [x]Decreased RC/scapular/core strength and neuromuscular control   []other:      Functional Activity Limitations (from functional questionnaire and intake)   [x]Reduced ability to tolerate prolonged functional positions   []Reduced ability or difficulty with changes of positions or transfers between positions   [x]Reduced ability to maintain good posture and demonstrate good body mechanics with sitting, bending, and lifting   [x] Reduced ability or tolerance with driving and/or computer work   [x]Reduced ability to sleep   [x]Reduced ability to perform lifting, reaching, carrying tasks   [x]Reduced ability to tolerate impact through UE   [x]Reduced ability to reach behind back   []Reduced ability to  or hold objects   [x]Reduced ability to throw or toss an object   []other:    Participation Restrictions   []Reduced participation in self care activities   [x]Reduced participation in complexity using standardized patient assessment instrument and/or measurable assessment of functional outcome. [x] EVAL (LOW) 87332 (typically 20 minutes face-to-face)  [] EVAL (MOD) 17122 (typically 30 minutes face-to-face)  [] EVAL (HIGH) 67286 (typically 45 minutes face-to-face)  [] RE-EVAL       PLAN:  Frequency/Duration:  1 days per week for 6 Weeks:  INTERVENTIONS:  [x] Therapeutic exercise including: strength training, ROM, for Upper extremity and core   [x]  NMR activation and proprioception for UE, scap and Core   [x] Manual therapy as indicated for shoulder, scapula and spine to include: Dry Needling/IASTM, STM, PROM, Gr I-IV mobilizations, manipulation. [x] Modalities as needed that may include: thermal agents, E-stim, Biofeedback, US, iontophoresis as indicated  [x] Patient education on joint protection, postural re-education, activity modification, progression of HEP. HEP instruction: (see scanned forms)    GOALS:  Patient stated goal: Relief of B shoulder pain    Therapist goals for Patient:   Short Term Goals: To be achieved in: 2 weeks  1. Independent in HEP and progression per patient tolerance, in order to prevent re-injury. 2. Patient will have a decrease in pain to facilitate improvement in movement, function, and ADLs as indicated by Functional Deficits. Long Term Goals: To be achieved in: 6 weeks  1. Disability index score of 39% or less for the [facility] to assist with reaching prior level of function. 2. Patient will demonstrate full AROM with reported pain no greater than 3/10 to allow for proper joint functioning and to allow the performance of overhead ADLs and IADLs. 3. Patient will demonstrate B UE strength of at least 4+/5 to allow for proper functional mobility such as with opening a jar, carrying and lifting. 4. Patient will demonstrate upright posture in a seated position for at least 30 minutes while at work to reduce her complaints of pain in prolonged positions.

## 2018-09-19 NOTE — FLOWSHEET NOTE
[facility], [address]  Phone: [phone]  Fax [phone]      Physical Therapy Daily Treatment Note  Date:  2018    Patient Name:  Abida Good    :  1960  MRN: [id number]  Restrictions/Precautions:    Medical/Treatment Diagnosis Information:  Diagnosis: M25.511, M25.512 - Pain in B shoulder joints; R29.898 - weakness of shoulders  Treatment Diagnosis: M75.41 - Impingement of R shoulder, M75.42 - Impingement of L shoulder, M25.611 - stiffness of R shoulder, M25.612 - stiffness of L shoulder, M25.511 - pain in R shoulder; M25.512 pain in L shoulder  Insurance/Certification information:  PT Insurance Information: ANTHEM  Physician Information:  Referring Practitioner: Olvin Albright of care signed (Y/N):     Date of Patient follow up with Physician:     G-Code (if applicable):   CK  Date G-Code Applied:  2018  PT G-Codes  Functional Assessment Tool Used: QuickDASH  Score: 52%  Functional Limitation: Carrying, moving and handling objects  Carrying, Moving and Handling Objects Current Status (): At least 40 percent but less than 60 percent impaired, limited or restricted  Carrying, Moving and Handling Objects Goal Status ():  At least 20 percent but less than 40 percent impaired, limited or restricted    Progress Note: [x]  Yes  []  No  Next due by: Visit #10      Latex Allergy:  [x]NO      []YES  Preferred Language for Healthcare:   [x]English       []other:    Visit # Insurance Allowable Requires auth   1 this POC 20 (pt has 2 POC and has used 3 for knee POC already 2018)    []no        [x]yes:     Pain level:  8/10 R; 8/10 L     SUBJECTIVE:  See eval    OBJECTIVE: See eval  Observation:   Test measurements:      RESTRICTIONS/PRECAUTIONS: R knee arthroscopy 2018    Exercises/Interventions:   Therapeutic Ex Sets/rep comments   Pt ed on diagnosis, prognosis, plan of care, testing complaints of pain in prolonged positions.              Progression Towards Functional goals:  [] Patient is progressing as expected towards functional goals listed. [] Progression is slowed due to complexities listed. [] Progression has been slowed due to co-morbidities.   [x] Plan just implemented, too soon to assess goals progression  [] Other:     ASSESSMENT:  See eval    Treatment/Activity Tolerance:  [] Patient tolerated treatment well [] Patient limited by fatique  [x] Patient limited by pain  [] Patient limited by other medical complications  [] Other:     Prognosis: [x] Good [] Fair  [] Poor    Patient Requires Follow-up: [x] Yes  [] No    PLAN: See eval  [] Continue per plan of care [] Alter current plan (see comments)  [x] Plan of care initiated [] Hold pending MD visit [] Discharge    Electronically signed by: Charmayne Herb, PT, DPT     BELA Lentz  Physical therapist was present and provided direct supervision and collaboration/assessment while treatment was provided by student physical therapist.

## 2018-09-24 ENCOUNTER — HOSPITAL ENCOUNTER (OUTPATIENT)
Dept: PHYSICAL THERAPY | Age: 58
Setting detail: THERAPIES SERIES
Discharge: HOME | End: 2018-09-24
Payer: COMMERCIAL

## 2018-09-24 PROCEDURE — 97110 THERAPEUTIC EXERCISES: CPT

## 2018-09-24 PROCEDURE — 97140 MANUAL THERAPY 1/> REGIONS: CPT

## 2018-09-24 NOTE — FLOWSHEET NOTE
Balance to Lunge                   Walking        Knee Extension Bilat. 10# 3x10                              Ecc.                               Inv. Hamstring Curls Bilat. 20# 3x10                              Ecc.                               Inv.        Soleus Press Bilat.                            Ecc.                           Inv.        Standing Heel Raise                 x30       Ladders                Square               Jump/Hop  Low                      Med.                      High                                                            Modality declined   Initials                             DM   Time spent with PT assistant
therapy to mobilize LE, proximal hip and/or LS spine soft tissue/joints for the purpose of modulating pain, promoting relaxation,  increasing ROM, reducing/eliminating soft tissue swelling/inflammation/restriction, improving soft tissue extensibility and allowing for proper ROM for normal function with self care, mobility, lifting and ambulation. Modalities:  Declined, to ice at home  Charges:  Timed Code Treatment Minutes: 40   Total Treatment Minutes: 44 (bike)     [] EVAL (LOW) 14956 (typically 20 minutes face-to-face)  [] EVAL (MOD) 43972 (typically 30 minutes face-to-face)  [] EVAL (HIGH) 25484 (typically 45 minutes face-to-face)  [] RE-EVAL     [x] BF(14209) x  2   [] IONTO  [] NMR (94909) x      [] VASO  [x] Manual (51045) x  1    [] Other:  [] TA x       [] Mech Traction (41569)  [] ES(attended) (75575)      [] ES (un) (55744):     GOALS: Patient stated goal: Working in the garden and return to regular work in gym.     Therapist goals for Patient:   Short Term Goals: To be achieved in: 2 weeks  1. Independent in HEP and progression per patient tolerance, in order to prevent re-injury. 2. Patient will have a decrease in pain to facilitate improvement in movement, function, and ADLs as indicated by Functional Deficits.     Long Term Goals: To be achieved in: 4 weeks  1. Disability index score of 20% or less for the LEFS to assist with reaching prior level of function. 2. Patient will demonstrate increased AROM to 0-130 to allow for proper joint functioning as indicated by patients Functional Deficits. 3. Patient will demonstrate 5/5 MMT for all R hip and knee motions measured for improved strength and control to allow for ability to perform functional tasks. 4. Patient will return to community ambulation without the use of AD and without significant LOB functional activities without increased symptoms or restriction.    5. Patient will return to gardening and other functional activities with

## 2018-09-26 ENCOUNTER — HOSPITAL ENCOUNTER (OUTPATIENT)
Dept: PHYSICAL THERAPY | Age: 58
Setting detail: THERAPIES SERIES
Discharge: HOME | End: 2018-09-26
Payer: COMMERCIAL

## 2018-09-26 PROCEDURE — 97140 MANUAL THERAPY 1/> REGIONS: CPT

## 2018-09-26 PROCEDURE — 97110 THERAPEUTIC EXERCISES: CPT

## 2018-09-26 PROCEDURE — G0283 ELEC STIM OTHER THAN WOUND: HCPCS

## 2018-09-26 NOTE — FLOWSHEET NOTE
Reviewed/Progressed HEP activities related to strengthening, flexibility, endurance, ROM of scapular, scapulothoracic and UE control with self care, reaching, carrying, lifting, house/yardwork, driving/computer work  [] (06308) Reviewed/Progressed HEP activities related to improving balance, coordination, kinesthetic sense, posture, motor skill, proprioception of scapular, scapulothoracic and UE control with self care, reaching, carrying, lifting, house/yardwork, driving/computer work      Manual Treatments:  PROM / STM / Oscillations-Mobs:  G-I, II, III, IV (PA's, Inf., Post.)  [x] (21329) Provided manual therapy to mobilize soft tissue/joints of cervical/CT, scapular GHJ and UE for the purpose of modulating pain, promoting relaxation,  increasing ROM, reducing/eliminating soft tissue swelling/inflammation/restriction, improving soft tissue extensibility and allowing for proper ROM for normal function with self care, reaching, carrying, lifting, house/yardwork, driving/computer work    Modalities:  PM B shoulder + MHP x 15'    Charges:  Timed Code Treatment Minutes: 30   Total Treatment Minutes: 45 ( stim/CP)     [] EVAL (LOW) 23641 (typically 20 minutes face-to-face)  [] EVAL (MOD) 70569 (typically 30 minutes face-to-face)  [] EVAL (HIGH) 29648 (typically 45 minutes face-to-face)  [] RE-EVAL     [x] ZH(45175) x  1   [] IONTO  [] NMR (64255) x      [] VASO  [x] Manual (22306) x  1    [] Other:  [] TA x       [] Mech Traction (01749)  [] ES(attended) (98920)      [x] ES (un) (98104):     GOALS:Short Term Goals: To be achieved in: 2 weeks  1. Independent in HEP and progression per patient tolerance, in order to prevent re-injury. 2. Patient will have a decrease in pain to facilitate improvement in movement, function, and ADLs as indicated by Functional Deficits.     Long Term Goals: To be achieved in: 6 weeks  1.  Disability index score of 39% or less for the [facility] to assist with reaching prior level of

## 2018-10-01 ENCOUNTER — APPOINTMENT (OUTPATIENT)
Dept: PHYSICAL THERAPY | Age: 58
End: 2018-10-01
Payer: COMMERCIAL

## 2018-10-03 ENCOUNTER — HOSPITAL ENCOUNTER (OUTPATIENT)
Dept: PHYSICAL THERAPY | Age: 58
Setting detail: THERAPIES SERIES
Discharge: HOME | End: 2018-10-03
Payer: COMMERCIAL

## 2018-10-03 PROCEDURE — G0283 ELEC STIM OTHER THAN WOUND: HCPCS

## 2018-10-03 PROCEDURE — 97110 THERAPEUTIC EXERCISES: CPT

## 2018-10-03 PROCEDURE — 97140 MANUAL THERAPY 1/> REGIONS: CPT

## 2018-10-03 NOTE — FLOWSHEET NOTE
Program:    [x] (09109) Reviewed/Progressed HEP activities related to strengthening, flexibility, endurance, ROM of scapular, scapulothoracic and UE control with self care, reaching, carrying, lifting, house/yardwork, driving/computer work  [] (88561) Reviewed/Progressed HEP activities related to improving balance, coordination, kinesthetic sense, posture, motor skill, proprioception of scapular, scapulothoracic and UE control with self care, reaching, carrying, lifting, house/yardwork, driving/computer work      Manual Treatments:  PROM / STM / Oscillations-Mobs:  G-I, II, III, IV (PA's, Inf., Post.)  [x] (21827) Provided manual therapy to mobilize soft tissue/joints of cervical/CT, scapular GHJ and UE for the purpose of modulating pain, promoting relaxation,  increasing ROM, reducing/eliminating soft tissue swelling/inflammation/restriction, improving soft tissue extensibility and allowing for proper ROM for normal function with self care, reaching, carrying, lifting, house/yardwork, driving/computer work    Modalities:  PM B shoulder + MHP x 15'    Charges:  Timed Code Treatment Minutes: 30   Total Treatment Minutes: 55 ( stim/CP, rest breaks)     [] EVAL (LOW) 59816 (typically 20 minutes face-to-face)  [] EVAL (MOD) 70996 (typically 30 minutes face-to-face)  [] EVAL (HIGH) 49364 (typically 45 minutes face-to-face)  [] RE-EVAL     [x] LV(02976) x  1   [] IONTO  [] NMR (52767) x      [] VASO  [x] Manual (86596) x  1    [] Other:  [] TA x       [] Mech Traction (93309)  [] ES(attended) (20698)      [x] ES (un) (65935):     GOALS:Short Term Goals: To be achieved in: 2 weeks  1. Independent in HEP and progression per patient tolerance, in order to prevent re-injury. 2. Patient will have a decrease in pain to facilitate improvement in movement, function, and ADLs as indicated by Functional Deficits.     Long Term Goals: To be achieved in: 6 weeks  1.  Disability index score of 39% or less for the [facility] to assist

## 2018-10-04 ENCOUNTER — OFFICE VISIT (OUTPATIENT)
Dept: ORTHOPEDIC SURGERY | Age: 58
End: 2018-10-04
Payer: COMMERCIAL

## 2018-10-04 VITALS — BODY MASS INDEX: 33.66 KG/M2 | HEIGHT: 63 IN | WEIGHT: 190 LBS

## 2018-10-04 DIAGNOSIS — M25.512 ACUTE PAIN OF BOTH SHOULDERS: ICD-10-CM

## 2018-10-04 DIAGNOSIS — M25.511 ACUTE PAIN OF BOTH SHOULDERS: ICD-10-CM

## 2018-10-04 DIAGNOSIS — Z98.890 S/P ARTHROSCOPY OF RIGHT KNEE: Primary | ICD-10-CM

## 2018-10-04 DIAGNOSIS — M75.02 BILATERAL ADHESIVE CAPSULITIS OF SHOULDERS: ICD-10-CM

## 2018-10-04 DIAGNOSIS — M75.102 TEAR OF LEFT ROTATOR CUFF, UNSPECIFIED TEAR EXTENT: ICD-10-CM

## 2018-10-04 DIAGNOSIS — M75.01 BILATERAL ADHESIVE CAPSULITIS OF SHOULDERS: ICD-10-CM

## 2018-10-04 PROCEDURE — 99213 OFFICE O/P EST LOW 20 MIN: CPT | Performed by: ORTHOPAEDIC SURGERY

## 2018-10-05 ENCOUNTER — TELEPHONE (OUTPATIENT)
Dept: ORTHOPEDIC SURGERY | Age: 58
End: 2018-10-05

## 2018-10-08 ENCOUNTER — APPOINTMENT (OUTPATIENT)
Dept: PHYSICAL THERAPY | Age: 58
End: 2018-10-08
Payer: COMMERCIAL

## 2018-10-10 ENCOUNTER — APPOINTMENT (OUTPATIENT)
Dept: PHYSICAL THERAPY | Age: 58
End: 2018-10-10
Payer: COMMERCIAL

## 2018-10-15 ENCOUNTER — OFFICE VISIT (OUTPATIENT)
Dept: ORTHOPEDIC SURGERY | Age: 58
End: 2018-10-15
Payer: COMMERCIAL

## 2018-10-15 VITALS — WEIGHT: 190.04 LBS | HEIGHT: 63 IN | BODY MASS INDEX: 33.67 KG/M2

## 2018-10-15 DIAGNOSIS — M25.512 ACUTE PAIN OF BOTH SHOULDERS: ICD-10-CM

## 2018-10-15 DIAGNOSIS — M25.511 ACUTE PAIN OF BOTH SHOULDERS: ICD-10-CM

## 2018-10-15 DIAGNOSIS — M75.02 ADHESIVE CAPSULITIS OF LEFT SHOULDER: Primary | ICD-10-CM

## 2018-10-15 PROCEDURE — 99212 OFFICE O/P EST SF 10 MIN: CPT | Performed by: PHYSICIAN ASSISTANT

## 2018-10-15 NOTE — PROGRESS NOTES
CHIEF COMPLAINT:    Chief Complaint   Patient presents with    Shoulder Pain     LEFT SHOULDER MRI RESULTS       HISTORY OF PRESENT ILLNESS:                The patient is a 63 y.o. female this patient presents today for the MR results of her LEFT shoulder. She's been having pain in her shoulder despite working in therapy. Really this week, she's made some improvements on her own. She is feeling good about her progress overall.   Past Medical History:   Diagnosis Date    Primary osteoarthritis of right foot 2016          The pain assessment was noted & is as follows:  Pain Assessment  Location of Pain: Shoulder  Location Modifiers: Left  Severity of Pain: 4  Quality of Pain: Dull, Aching  Duration of Pain: A few hours  Frequency of Pain: Several times daily  Aggravating Factors: Bending, Stretching  Limiting Behavior: Some  Relieving Factors: Rest  Result of Injury: No  Work-Related Injury: No  Are there other pain locations you wish to document?: No]      Work Status/Functionality:     Past Medical History: Medical history form was reviewed today & can be found in the media tab  Past Medical History:   Diagnosis Date    Primary osteoarthritis of right foot 2016      Past Surgical History:     Past Surgical History:   Procedure Laterality Date     SECTION      2    FOOT TENDON SURGERY Right 2010    peroneus brevis tendon repair    HYSTERECTOMY      partial    KNEE ARTHROSCOPY Bilateral     total 3    KNEE ARTHROSCOPY Right 2018    VT ARTHRS KNEE W/MENISCECTOMY MED&LAT W/SHAVING Right 2018    EXAM UNDER ANESTHESIA VIDEO ARTHROSCOPY RIGHT KNEE, PARTIAL LATERAL MENISCECTOMY,  CHONDROPLASTY PATELLA , SYNOVECTOMY performed by Rene Morgan MD at [address] [place] [address]       Current Medications:     Current Outpatient Prescriptions:     aspirin (SUSIE ASPIRIN) 325 MG tablet, Take 1 tablet by mouth daily for 14 days To prevent blood clot, Disp: 14 tablet, Rfl: 0   diphenhydrAMINE (BENADRYL) 25 MG tablet, Take 25 mg by mouth as needed for Itching, Disp: , Rfl:     diclofenac (VOLTAREN) 75 MG EC tablet, Take 1 tablet by mouth 2 times daily, Disp: 60 tablet, Rfl: 1    estradiol (ESTRACE) 0.5 MG tablet, Take 0.5 mg by mouth daily , Disp: , Rfl:   Allergies:  Codeine  Social History:    reports that she has never smoked. She has never used smokeless tobacco. She reports that she drinks alcohol. She reports that she does not use drugs. Family History:   Family History   Problem Relation Age of Onset    Heart Disease Mother        REVIEW OF SYSTEMS:   For new problems, a full review of systems will be found scanned in the patient's chart. CONSTITUTIONAL: Denies unexplained weight loss, fevers, chills   NEUROLOGICAL: Denies unsteady gait or progressive weakness  SKIN: Denies skin changes, delayed healing, rash, itching       PHYSICAL EXAM:    Vitals: Height 5' 2.99\" (1.6 m), weight 190 lb 0.6 oz (86.2 kg). GENERAL EXAM:  · General Apparence: Patient is adequately groomed with no evidence of malnutrition. · Orientation: The patient is oriented to time, place and person. · Mood & Affect:The patient's mood and affect are appropriate       LEFT shoulder PHYSICAL EXAMINATION:  · Inspection:  No significant deformity ecchymosis or erythema     · Palpation:  The patient has some mild tenderness through the trapezius today but no significant bony tenderness over the LEFT shoulder itself      · Range of Motion: Range of motion today 0-125° of 4 flexion abduction, much improved    · Strength: No gross weakness with rotator cuff testing today    · Special Tests:  She can feel touch through the bilateral upper extremities            · Skin:  There are no rashes, ulcerations or lesions.   · There are no distal dysvascular changes     Gait & station: Within normal limits      Additional Examinations:              Diagnostic Testing:  I reviewed the MRI scan as well as the images of the LEFT shoulder, combination of adhesive and dry capsulitis but no evidence of rotator cuff tearing    Orders   No orders of the defined types were placed in this encounter. Assessment / Treatment Plan:     1. LEFT shoulder adhesive capsulitis, probable component of adhesive capsulitis on the RIGHT as well    The patient's shoulders are improving with ongoing therapy efforts. Continue with physical therapy and follow-up in 4 weeks p.r.n. She really has no interest in cortisone injection and is progressing well on her own. We did talk about the possibility of manipulation in the future if her progress slows. She is happy with the plan.

## 2018-10-18 RX ORDER — DICLOFENAC SODIUM 75 MG/1
75 TABLET, DELAYED RELEASE ORAL 2 TIMES DAILY
Qty: 60 TABLET | Refills: 1 | Status: SHIPPED | OUTPATIENT
Start: 2018-10-18

## 2018-10-19 ENCOUNTER — APPOINTMENT (OUTPATIENT)
Dept: PHYSICAL THERAPY | Age: 58
End: 2018-10-19
Payer: COMMERCIAL

## 2018-10-22 ENCOUNTER — HOSPITAL ENCOUNTER (OUTPATIENT)
Dept: PHYSICAL THERAPY | Age: 58
Setting detail: THERAPIES SERIES
Discharge: HOME | End: 2018-10-22
Payer: COMMERCIAL

## 2018-10-22 PROCEDURE — 97140 MANUAL THERAPY 1/> REGIONS: CPT

## 2018-10-22 PROCEDURE — 97110 THERAPEUTIC EXERCISES: CPT

## 2018-10-22 NOTE — FLOWSHEET NOTE
conservative tx in PT. Sh'es still taking NSAID, which is helping a lot. She's been working on her ROM still at home and feels she's made some progress with her mobility. She's still having difficulty sleeping on her R and L sides comfortably at night and doing end range reaching/pushing motions for ADLs. OBJECTIVE:   Observation: TTP R biceps; capsular pattern L shoulder but more tendonitis R shoulder  Test measurements:  Pre tx AROM flex R 135 and L 134; 98 abd R and 100 L; functional ER R thumb to C6 and L thumb to C4; functional IR R thumb to L3 and L thumb to L2    Post-tx: PROM R flex 145 and L flex 168, PROM scaption 180 R and 145 L, PROM ER R 69 L 45     RESTRICTIONS/PRECAUTIONS: R knee arthroscopy 2018    Exercises/Interventions:   Therapeutic Ex Sets/rep comments   Pt ed Pt ed on reasoning for working on frequent stretching throughout the day/continued heat before stretching and ice if sore following prn but she may do better with keeping her shoulders mobile now. Can consider ionto to R biceps tendon if continued soreness.   10'         Scapular retraction HEP   Cervical retraction HEP   Scalene stretch HEP   Cane ER stretch at 90 deg abd 10x R,L ,ax cue for correct form HEP   Cane flexion stretch 10x HEP   Shoulder flex, abd, ext isometric  HEP   Doorway pec stretch at 45 deg 1'    Doorframe ER stretch (wallk body away)  15\"x5 R/L    Pulleys: flex and scap 10x ea    Sleeper stretch 15\" x3 R/L ea    Strap stretch to inc IR: sup and lat 10\"x5 ea R/L With inf GHJ glide from PT   Post capsule S (horiz add) Review for HEP                                  Manual Intervention     XF massage on R B biceps tendon 3'   GHJ distraction/oscillation GII 15\"x6 R,L    GHJ post, inf mob (loose packed) 15\"x4 R,L    PROM ER, IR,   flex, abduction 16'                   NMR re-education                                                 Therapeutic Exercise and NMR EXR  [x] (49778) Provided verbal/tactile cueing for activities related to strengthening, flexibility, endurance, ROM  for improvements in scapular, scapulothoracic and UE control with self care, reaching, carrying, lifting, house/yardwork, driving/computer work. [x] (10224) Provided verbal/tactile cueing for activities related to improving balance, coordination, kinesthetic sense, posture, motor skill, proprioception  to assist with  scapular, scapulothoracic and UE control with self care, reaching, carrying, lifting, house/yardwork, driving/computer work. Therapeutic Activities:    [] (64423 or 75621) Provided verbal/tactile cueing for activities related to improving balance, coordination, kinesthetic sense, posture, motor skill, proprioception and motor activation to allow for proper function of scapular, scapulothoracic and UE control with self care, carrying, lifting, driving/computer work.      Home Exercise Program:    [x] (72907) Reviewed/Progressed HEP activities related to strengthening, flexibility, endurance, ROM of scapular, scapulothoracic and UE control with self care, reaching, carrying, lifting, house/yardwork, driving/computer work  [] (87530) Reviewed/Progressed HEP activities related to improving balance, coordination, kinesthetic sense, posture, motor skill, proprioception of scapular, scapulothoracic and UE control with self care, reaching, carrying, lifting, house/yardwork, driving/computer work      Manual Treatments:  PROM / STM / Oscillations-Mobs:  G-I, II, III, IV (PA's, Inf., Post.)  [x] (17347) Provided manual therapy to mobilize soft tissue/joints of cervical/CT, scapular GHJ and UE for the purpose of modulating pain, promoting relaxation,  increasing ROM, reducing/eliminating soft tissue swelling/inflammation/restriction, improving soft tissue extensibility and allowing for proper ROM for normal function with self care, reaching, carrying, lifting, house/yardwork, driving/computer work    Modalities:  MHP x 10'

## 2018-10-24 ENCOUNTER — APPOINTMENT (OUTPATIENT)
Dept: PHYSICAL THERAPY | Age: 58
End: 2018-10-24
Payer: COMMERCIAL

## 2018-10-29 ENCOUNTER — HOSPITAL ENCOUNTER (OUTPATIENT)
Dept: PHYSICAL THERAPY | Age: 58
Setting detail: THERAPIES SERIES
Discharge: HOME | End: 2018-10-29
Payer: COMMERCIAL

## 2018-10-29 PROCEDURE — 97140 MANUAL THERAPY 1/> REGIONS: CPT

## 2018-10-29 PROCEDURE — 97110 THERAPEUTIC EXERCISES: CPT

## 2018-10-29 NOTE — FLOWSHEET NOTE
pattern L shoulder but more tendonitis R shoulder  Test measurements:  Pre tx AROM flex R 165 and L 148; 145 abd R and 128 L; functional ER R thumb to C6 and L thumb to C4; functional IR R thumb to L3 and L thumb to L2; AAROM ER L 65 and R   Post-tx: PROM R flex 165 and L flex 170, PROM scaption 180 R and 175 L, PROM ER R  L      RESTRICTIONS/PRECAUTIONS: R knee arthroscopy 2018    Exercises/Interventions:   Therapeutic Ex Sets/rep comments   Pt ed Pt ed on reasoning for working on frequent stretching throughout the day/continued heat before stretching and ice if sore following prn but she may do better with keeping her shoulders mobile now. Can consider ionto to R biceps tendon if continued soreness. Scapular retraction HEP   Cervical retraction HEP   Scalene stretch HEP   Cane ER stretch at 90 deg abd 10x R,L ,ax cue for correct form HEP   Cane flexion stretch 10x HEP   Shoulder flex, abd, ext isometric  HEP   Doorway pec stretch at 45 deg    Doorframe ER stretch (wallk body away)     Pulleys: flex and scap 10x ea    Sleeper stretch 15\" x3 R/L ea    Strap stretch to inc IR: sup and lat 10\"x5 ea R/L With inf GHJ glide from PT   Post capsule S (horiz add) Review for HEP    SL ER 2x10 R/L HEP   Serratus reach 2x20 B HEP   rows 2x10 HEP red                  Manual Intervention     XF massage on R B biceps tendon 3'   GHJ distraction/oscillation GII 15\"x6 R,L    GHJ post, inf mob (loose packed) 15\"x4 R,L    PROM ER, IR,   flex, abduction 16'                   NMR re-education                                                 Therapeutic Exercise and NMR EXR  [x] (46811) Provided verbal/tactile cueing for activities related to strengthening, flexibility, endurance, ROM  for improvements in scapular, scapulothoracic and UE control with self care, reaching, carrying, lifting, house/yardwork, driving/computer work.     [x] (91642) Provided verbal/tactile cueing for activities related to improving balance,

## 2018-11-05 ENCOUNTER — HOSPITAL ENCOUNTER (OUTPATIENT)
Dept: PHYSICAL THERAPY | Age: 58
Setting detail: THERAPIES SERIES
Discharge: HOME | End: 2018-11-05
Payer: COMMERCIAL

## 2018-11-05 PROCEDURE — 97110 THERAPEUTIC EXERCISES: CPT

## 2018-11-05 PROCEDURE — 97140 MANUAL THERAPY 1/> REGIONS: CPT

## 2018-11-05 NOTE — FLOWSHEET NOTE
house/yardwork, driving/computer work. Therapeutic Activities:    [] (80930 or 86111) Provided verbal/tactile cueing for activities related to improving balance, coordination, kinesthetic sense, posture, motor skill, proprioception and motor activation to allow for proper function of scapular, scapulothoracic and UE control with self care, carrying, lifting, driving/computer work. Home Exercise Program:    [x] (97008) Reviewed/Progressed HEP activities related to strengthening, flexibility, endurance, ROM of scapular, scapulothoracic and UE control with self care, reaching, carrying, lifting, house/yardwork, driving/computer work  [] (87755) Reviewed/Progressed HEP activities related to improving balance, coordination, kinesthetic sense, posture, motor skill, proprioception of scapular, scapulothoracic and UE control with self care, reaching, carrying, lifting, house/yardwork, driving/computer work      Manual Treatments:  PROM / STM / Oscillations-Mobs:  G-I, II, III, IV (PA's, Inf., Post.)  [x] (16435) Provided manual therapy to mobilize soft tissue/joints of cervical/CT, scapular GHJ and UE for the purpose of modulating pain, promoting relaxation,  increasing ROM, reducing/eliminating soft tissue swelling/inflammation/restriction, improving soft tissue extensibility and allowing for proper ROM for normal function with self care, reaching, carrying, lifting, house/yardwork, driving/computer work    Modalities:     Charges:  Timed Code Treatment Minutes: 60   Total Treatment Minutes: 70 (rest breaks)     [] EVAL (LOW) 47599 (typically 20 minutes face-to-face)  [] EVAL (MOD) 80963 (typically 30 minutes face-to-face)  [] EVAL (HIGH) 06522 (typically 45 minutes face-to-face)  [] RE-EVAL     [x] VU(74306) x  2   [] IONTO  [] NMR (68332) x      [] VASO  [x] Manual (46397) x  2    [] Other:  [] TA x       [] Mech Traction (88078)  [] ES(attended) (53517)      [] ES (un) (09199):     GOALS:Short Term Goals:  To be PROM, AAROM, scapular re-training. TENS prn for pain, consider ionto for R biceps tendon.   [x] Continue per plan of care [] Alter current plan (see comments)  [] Plan of care initiated [] Hold pending MD visit [] Discharge    Electronically signed by: Shad Gallo, PT, DPT    Federica Gonsales, BELA  Physical therapist was present and provided direct supervision and collaboration/assessment while treatment was provided by student physical therapist.

## 2018-11-07 ENCOUNTER — HOSPITAL ENCOUNTER (OUTPATIENT)
Dept: PHYSICAL THERAPY | Age: 58
Setting detail: THERAPIES SERIES
Discharge: HOME | End: 2018-11-07
Payer: COMMERCIAL

## 2018-11-07 PROCEDURE — 97140 MANUAL THERAPY 1/> REGIONS: CPT

## 2018-11-07 PROCEDURE — 97110 THERAPEUTIC EXERCISES: CPT

## 2018-11-07 NOTE — FLOWSHEET NOTE
Patient limited by other medical complications  [] Other:     Prognosis: [x] Good [] Fair  [] Poor    Patient Requires Follow-up: [x] Yes  [] No    PLAN: Continue to restore PROM, AAROM, scapular re-training. TENS prn for pain, consider ionto for R biceps tendon.   [x] Continue per plan of care [] Alter current plan (see comments)  [] Plan of care initiated [] Hold pending MD visit [] Discharge    Electronically signed by: Alonzo Magallon, PT, DPT

## 2018-11-12 ENCOUNTER — APPOINTMENT (OUTPATIENT)
Dept: PHYSICAL THERAPY | Age: 58
End: 2018-11-12
Payer: COMMERCIAL

## 2018-11-14 ENCOUNTER — HOSPITAL ENCOUNTER (OUTPATIENT)
Dept: PHYSICAL THERAPY | Age: 58
Setting detail: THERAPIES SERIES
Discharge: HOME | End: 2018-11-14
Payer: COMMERCIAL

## 2018-11-14 PROCEDURE — 97110 THERAPEUTIC EXERCISES: CPT

## 2018-11-14 PROCEDURE — G8978 MOBILITY CURRENT STATUS: HCPCS

## 2018-11-14 PROCEDURE — G8979 MOBILITY GOAL STATUS: HCPCS

## 2018-11-14 PROCEDURE — 97140 MANUAL THERAPY 1/> REGIONS: CPT

## 2018-11-14 NOTE — FLOWSHEET NOTE
overhead now. She does this slowly to avoid any pain, but feels that she can continue with her HEP at this point. OBJECTIVE:   Observation: TTP R biceps; capsular pattern L shoulder but more tendonitis R shoulder  Test measurements:    AROM   flexion R 150, L 130  Abduction R 160, L 130   ER @ 90 deg abduction R 80, L 63, functional ER R thumb to T2 and L thumb to T2;     IR @ 90 deg abduction:R 60 deg, L 70, functional IR R thumb to T12 and L thumb to T12  PROM   flexion R 165, L150  scaption R 180, L 175      MMT  flexion L 4+/5, R 5-/5, abduction L 4/5, R 4+/5, IR 5/5 bilaterally, ER 4/5 bilaterally    RESTRICTIONS/PRECAUTIONS: R knee arthroscopy 2018    Exercises/Interventions:   Therapeutic Ex Sets/rep comments   Pt ed Pt ed continue with stretches/HEP, reviewed HEP, pt has not recovered full ROM or strength at this time, so will keep her chart open x 3-4 weeks.  If she has increased pain or no progress with stiffness, she can return to PT within that time-frame                                 8'         Scapular retraction HEP   Cervical retraction HEP   Scalene stretch HEP   Cane ER stretch at 90 deg abd Cue for correct form HEP   Cane flexion stretch 2# HEP   Shoulder flex, abd, ext isometric  HEP   Doorway pec stretch at 45 deg Review 1'   Doorframe ER stretch (wallk body away)  Complains of LBP   Pulleys: flex and scap  + HEP   Sleeper stretch    Strap stretch to inc IR: sup and lat    Post capsule S (horiz add)    SL ER HEP   Serratus reach HEP   Rows  Shoulder ext HEP red   \"no money\" Yellow TB   TT pull down 2x10 Red + HEP   Wall push-up - wrists flat 2x10 + HEP   Standing sh flexion, scaption to tolerance 2x10 ea R,L Back to wall             Manual Intervention     XF massage on R B biceps tendon    GHJ distraction/oscillation GII 15\"x6 R,L    GHJ post, inf mob (loose packed) 15\"x4 R,L    B UE PROM ER, IR,   flex, abduction 15'                   NMR re-education Therapeutic Exercise and NMR EXR  [x] (10623) Provided verbal/tactile cueing for activities related to strengthening, flexibility, endurance, ROM  for improvements in scapular, scapulothoracic and UE control with self care, reaching, carrying, lifting, house/yardwork, driving/computer work. [x] (48236) Provided verbal/tactile cueing for activities related to improving balance, coordination, kinesthetic sense, posture, motor skill, proprioception  to assist with  scapular, scapulothoracic and UE control with self care, reaching, carrying, lifting, house/yardwork, driving/computer work. Therapeutic Activities:    [] (74898 or 93424) Provided verbal/tactile cueing for activities related to improving balance, coordination, kinesthetic sense, posture, motor skill, proprioception and motor activation to allow for proper function of scapular, scapulothoracic and UE control with self care, carrying, lifting, driving/computer work.      Home Exercise Program:    [x] (92189) Reviewed/Progressed HEP activities related to strengthening, flexibility, endurance, ROM of scapular, scapulothoracic and UE control with self care, reaching, carrying, lifting, house/yardwork, driving/computer work  [] (67373) Reviewed/Progressed HEP activities related to improving balance, coordination, kinesthetic sense, posture, motor skill, proprioception of scapular, scapulothoracic and UE control with self care, reaching, carrying, lifting, house/yardwork, driving/computer work      Manual Treatments:  PROM / STM / Oscillations-Mobs:  G-I, II, III, IV (PA's, Inf., Post.)  [x] (84938) Provided manual therapy to mobilize soft tissue/joints of cervical/CT, scapular GHJ and UE for the purpose of modulating pain, promoting relaxation,  increasing ROM, reducing/eliminating soft tissue swelling/inflammation/restriction, improving soft tissue extensibility and allowing for proper ROM for normal function with self care, reaching, carrying, lifting, house/yardwork, driving/computer work    Modalities:     Charges:  Timed Code Treatment Minutes: 35   Total Treatment Minutes: 40 (re-assessment     [] EVAL (LOW) 16342 (typically 20 minutes face-to-face)  [] EVAL (MOD) 72701 (typically 30 minutes face-to-face)  [] EVAL (HIGH) 34619 (typically 45 minutes face-to-face)  [] RE-EVAL     [x] EC(76304) x  1   [] IONTO  [] NMR (14405) x      [] VASO  [x] Manual (45509) x  1    [] Other:  [] TA x       [] Mech Traction (15476)  [] ES(attended) (15894)      [] ES (un) (80716):     GOALS:Short Term Goals: To be achieved in: 2 weeks  1. Independent in HEP and progression per patient tolerance, in order to prevent re-injury. met   2. Patient will have a decrease in pain to facilitate improvement in movement, function, and ADLs as indicated by Functional Deficits. met     Long Term Goals: To be achieved in: 6 weeks  1. Disability index score of 39% or less for the [facility] to assist with reaching prior level of function. met  2. Patient will demonstrate full AROM with reported pain no greater than 3/10 to allow for proper joint functioning and to allow the performance of overhead ADLs and IADLs. Pain reduced and shoulder AROM functional but not full   3. Patient will demonstrate B UE strength of at least 4+/5 to allow for proper functional mobility such as with opening a jar, carrying and lifting. Not met, but progress  4. Patient will demonstrate upright posture in a seated position for at least 30 minutes while at work to reduce her complaints of pain in prolonged positions. met       Progression Towards Functional goals:  [] Patient is progressing as expected towards functional goals listed. [x] Progression is slowed due to complexities listed. [] Progression has been slowed due to co-morbidities.   [] Plan just implemented, too soon to assess goals progression  [] Other:     ASSESSMENT:  Pt has made progress toward her goals and at this time wishes to

## 2018-11-15 NOTE — PROGRESS NOTES
HEP    Frequency/Duration:  1 days per week for 4 Weeks: only if increase in sx  HEP instruction:  1. Therapeutic exercise including: strength training, ROM, NMR and proprioception for the scapula, core and Upper extremity  2. Manual therapy as indicated including Dry Needling/IASTM, STM, PROM, Gr I-IV mobilizations, spinal mobilization/manipulation. 3. Modalities as needed including: thermal agents, E-stim, US, iontophoresis as indicated. 4. Patient education on joint protection, activity modification, progression of HEP.        Electronically signed by:  Esther Herzog, PT, DPT

## 2018-11-19 ENCOUNTER — APPOINTMENT (OUTPATIENT)
Dept: PHYSICAL THERAPY | Age: 58
End: 2018-11-19
Payer: COMMERCIAL

## 2018-11-26 ENCOUNTER — APPOINTMENT (OUTPATIENT)
Dept: PHYSICAL THERAPY | Age: 58
End: 2018-11-26
Payer: COMMERCIAL

## 2019-04-29 RX ORDER — DICLOFENAC SODIUM 75 MG/1
75 TABLET, DELAYED RELEASE ORAL 2 TIMES DAILY WITH MEALS
Qty: 180 TABLET | Refills: 1 | Status: SHIPPED | OUTPATIENT
Start: 2019-04-29

## (undated) DEVICE — SOLUTION IRRIG 5L LAC R BG

## (undated) DEVICE — TUBE IRRIG L8IN LNG PT W/ CONN FOR PMP SYS REDEUCE

## (undated) DEVICE — T-DRAPE,EXTREMITY,STERILE: Brand: MEDLINE

## (undated) DEVICE — CATHETER IV 20GA L1.25IN PNK FEP SFTY STR HUB RADPQ DISP

## (undated) DEVICE — GOWN,SIRUS,NON REINFRCD,LARGE,SET IN SL: Brand: MEDLINE

## (undated) DEVICE — CONVERTED USE 304929 SPONGE GAUZE CURITY 4X4IN 16-PLY ST

## (undated) DEVICE — SUTURE MCRYL SZ 4-0 L18IN ABSRB UD L19MM PS-2 3/8 CIR PRIM Y496G

## (undated) DEVICE — ZIMMER® STERILE DISPOSABLE TOURNIQUET CUFF WITH PLC, DUAL PORT, SINGLE BLADDER, 30 IN. (76 CM)

## (undated) DEVICE — Z CONVERTED USE 2273232 BANDAGE COMPR W6INXL11YD E KNIT DBL SELF CLSR EZE-BAND

## (undated) DEVICE — SET ADMIN PRIMING 7ML L30IN 7.35LB 20 GTT 2ND RLER CLMP

## (undated) DEVICE — PADDING CAST W6INXL4YD ST COT RAYON MICROPLEATED HIGHLY

## (undated) DEVICE — SET GRAV VENT NVENT CK VLV 3 NDL FREE PRT 10 GTT

## (undated) DEVICE — PACK PROCEDURE SURG ARTHSCP CUST

## (undated) DEVICE — STERILE POLYISOPRENE POWDER-FREE SURGICAL GLOVES: Brand: PROTEXIS

## (undated) DEVICE — 3M™ TEGADERM™ TRANSPARENT FILM DRESSING FRAME STYLE, 1624W, 2-3/8 IN X 2-3/4 IN (6 CM X 7 CM), 100/CT 4CT/CASE: Brand: 3M™ TEGADERM™

## (undated) DEVICE — 3M™ STERI-STRIP™ REINFORCED ADHESIVE SKIN CLOSURES, R1547, 1/2 IN X 4 IN (12 MM X 100 MM), 6 STRIPS/ENVELOPE: Brand: 3M™ STERI-STRIP™

## (undated) DEVICE — TUBING PMP L8FT LNG W/ CONN FOR AR-6400 REDEUCE

## (undated) DEVICE — GLOVE SURG SZ 65 L12IN FNGR THK94MIL STD WHT LTX FREE

## (undated) DEVICE — PADDING CAST W4INXL4YD NONSTERILE COT RAYON MICROPLEATED

## (undated) DEVICE — SOLUTION IV 1000ML LAC RINGERS PH 6.5 INJ USP VIAFLX PLAS

## (undated) DEVICE — 4.5 MM INCISOR PLUS STRAIGHT                                    BLADES, POWER/EP-1, VIOLET, PACKAGED                                    6 PER BOX, STERILE